# Patient Record
Sex: MALE | Race: WHITE | NOT HISPANIC OR LATINO | Employment: OTHER | URBAN - METROPOLITAN AREA
[De-identification: names, ages, dates, MRNs, and addresses within clinical notes are randomized per-mention and may not be internally consistent; named-entity substitution may affect disease eponyms.]

---

## 2023-06-19 LAB
ALANINE AMINOTRANSFERASE (SGPT) (U/L) IN SER/PLAS: 24 U/L (ref 10–52)
ALBUMIN (G/DL) IN SER/PLAS: 3.9 G/DL (ref 3.4–5)
ALKALINE PHOSPHATASE (U/L) IN SER/PLAS: 107 U/L (ref 33–136)
ANION GAP IN SER/PLAS: 13 MMOL/L (ref 10–20)
ASPARTATE AMINOTRANSFERASE (SGOT) (U/L) IN SER/PLAS: 22 U/L (ref 9–39)
BILIRUBIN TOTAL (MG/DL) IN SER/PLAS: 0.8 MG/DL (ref 0–1.2)
CALCIUM (MG/DL) IN SER/PLAS: 9 MG/DL (ref 8.6–10.3)
CARBON DIOXIDE, TOTAL (MMOL/L) IN SER/PLAS: 25 MMOL/L (ref 21–32)
CHLORIDE (MMOL/L) IN SER/PLAS: 102 MMOL/L (ref 98–107)
CHOLESTEROL (MG/DL) IN SER/PLAS: 78 MG/DL (ref 0–199)
CHOLESTEROL IN HDL (MG/DL) IN SER/PLAS: 32.9 MG/DL
CHOLESTEROL/HDL RATIO: 2.4
CREATININE (MG/DL) IN SER/PLAS: 1.45 MG/DL (ref 0.5–1.3)
ERYTHROCYTE DISTRIBUTION WIDTH (RATIO) BY AUTOMATED COUNT: 15.6 % (ref 11.5–14.5)
ERYTHROCYTE MEAN CORPUSCULAR HEMOGLOBIN CONCENTRATION (G/DL) BY AUTOMATED: 32.3 G/DL (ref 32–36)
ERYTHROCYTE MEAN CORPUSCULAR VOLUME (FL) BY AUTOMATED COUNT: 88 FL (ref 80–100)
ERYTHROCYTES (10*6/UL) IN BLOOD BY AUTOMATED COUNT: 4.9 X10E12/L (ref 4.5–5.9)
GFR MALE: 52 ML/MIN/1.73M2
GLUCOSE (MG/DL) IN SER/PLAS: 129 MG/DL (ref 74–99)
HEMATOCRIT (%) IN BLOOD BY AUTOMATED COUNT: 43.3 % (ref 41–52)
HEMOGLOBIN (G/DL) IN BLOOD: 14 G/DL (ref 13.5–17.5)
INR IN PPP BY COAGULATION ASSAY: 1.6 (ref 0.9–1.1)
LDL: 29 MG/DL (ref 0–99)
LEUKOCYTES (10*3/UL) IN BLOOD BY AUTOMATED COUNT: 9.6 X10E9/L (ref 4.4–11.3)
MAGNESIUM (MG/DL) IN SER/PLAS: 2.31 MG/DL (ref 1.6–2.4)
NATRIURETIC PEPTIDE B (PG/ML) IN SER/PLAS: 159 PG/ML (ref 0–99)
PLATELETS (10*3/UL) IN BLOOD AUTOMATED COUNT: 140 X10E9/L (ref 150–450)
POTASSIUM (MMOL/L) IN SER/PLAS: 4.2 MMOL/L (ref 3.5–5.3)
PROTEIN TOTAL: 7.6 G/DL (ref 6.4–8.2)
PROTHROMBIN TIME (PT) IN PPP BY COAGULATION ASSAY: 18.6 SEC (ref 9.8–13.4)
SODIUM (MMOL/L) IN SER/PLAS: 136 MMOL/L (ref 136–145)
TRIGLYCERIDE (MG/DL) IN SER/PLAS: 81 MG/DL (ref 0–149)
UREA NITROGEN (MG/DL) IN SER/PLAS: 37 MG/DL (ref 6–23)
VLDL: 16 MG/DL (ref 0–40)

## 2023-07-18 LAB
MUCUS, URINE: NORMAL /LPF
RBC, URINE: NORMAL /HPF (ref 0–5)
WBC, URINE: <1 /HPF (ref 0–5)

## 2023-07-24 LAB
ALANINE AMINOTRANSFERASE (SGPT) (U/L) IN SER/PLAS: 20 U/L (ref 10–52)
ALBUMIN (G/DL) IN SER/PLAS: 3.9 G/DL (ref 3.4–5)
ALKALINE PHOSPHATASE (U/L) IN SER/PLAS: 105 U/L (ref 33–136)
ANION GAP IN SER/PLAS: 11 MMOL/L (ref 10–20)
ASPARTATE AMINOTRANSFERASE (SGOT) (U/L) IN SER/PLAS: 18 U/L (ref 9–39)
BASOPHILS (10*3/UL) IN BLOOD BY AUTOMATED COUNT: 0.07 X10E9/L (ref 0–0.1)
BASOPHILS/100 LEUKOCYTES IN BLOOD BY AUTOMATED COUNT: 0.7 % (ref 0–2)
BILIRUBIN TOTAL (MG/DL) IN SER/PLAS: 0.9 MG/DL (ref 0–1.2)
CALCIUM (MG/DL) IN SER/PLAS: 8.9 MG/DL (ref 8.6–10.3)
CARBON DIOXIDE, TOTAL (MMOL/L) IN SER/PLAS: 26 MMOL/L (ref 21–32)
CHLORIDE (MMOL/L) IN SER/PLAS: 102 MMOL/L (ref 98–107)
CHOLESTEROL (MG/DL) IN SER/PLAS: 80 MG/DL (ref 0–199)
CHOLESTEROL IN HDL (MG/DL) IN SER/PLAS: 33.3 MG/DL
CHOLESTEROL/HDL RATIO: 2.4
CREATININE (MG/DL) IN SER/PLAS: 1.55 MG/DL (ref 0.5–1.3)
EOSINOPHILS (10*3/UL) IN BLOOD BY AUTOMATED COUNT: 0.27 X10E9/L (ref 0–0.4)
EOSINOPHILS/100 LEUKOCYTES IN BLOOD BY AUTOMATED COUNT: 2.6 % (ref 0–6)
ERYTHROCYTE DISTRIBUTION WIDTH (RATIO) BY AUTOMATED COUNT: 15.7 % (ref 11.5–14.5)
ERYTHROCYTE MEAN CORPUSCULAR HEMOGLOBIN CONCENTRATION (G/DL) BY AUTOMATED: 32.3 G/DL (ref 32–36)
ERYTHROCYTE MEAN CORPUSCULAR VOLUME (FL) BY AUTOMATED COUNT: 88 FL (ref 80–100)
ERYTHROCYTES (10*6/UL) IN BLOOD BY AUTOMATED COUNT: 4.72 X10E12/L (ref 4.5–5.9)
GFR MALE: 48 ML/MIN/1.73M2
GLUCOSE (MG/DL) IN SER/PLAS: 121 MG/DL (ref 74–99)
HEMATOCRIT (%) IN BLOOD BY AUTOMATED COUNT: 41.5 % (ref 41–52)
HEMOGLOBIN (G/DL) IN BLOOD: 13.4 G/DL (ref 13.5–17.5)
IMMATURE GRANULOCYTES/100 LEUKOCYTES IN BLOOD BY AUTOMATED COUNT: 0.4 % (ref 0–0.9)
LDL: 32 MG/DL (ref 0–99)
LEUKOCYTES (10*3/UL) IN BLOOD BY AUTOMATED COUNT: 10.4 X10E9/L (ref 4.4–11.3)
LYMPHOCYTES (10*3/UL) IN BLOOD BY AUTOMATED COUNT: 1.46 X10E9/L (ref 0.8–3)
LYMPHOCYTES/100 LEUKOCYTES IN BLOOD BY AUTOMATED COUNT: 14 % (ref 13–44)
MONOCYTES (10*3/UL) IN BLOOD BY AUTOMATED COUNT: 0.8 X10E9/L (ref 0.05–0.8)
MONOCYTES/100 LEUKOCYTES IN BLOOD BY AUTOMATED COUNT: 7.7 % (ref 2–10)
NEUTROPHILS (10*3/UL) IN BLOOD BY AUTOMATED COUNT: 7.79 X10E9/L (ref 1.6–5.5)
NEUTROPHILS/100 LEUKOCYTES IN BLOOD BY AUTOMATED COUNT: 74.6 % (ref 40–80)
PLATELETS (10*3/UL) IN BLOOD AUTOMATED COUNT: 133 X10E9/L (ref 150–450)
POTASSIUM (MMOL/L) IN SER/PLAS: 4.2 MMOL/L (ref 3.5–5.3)
PROSTATE SPECIFIC AG (NG/ML) IN SER/PLAS: 2.7 NG/ML (ref 0–4)
PROTEIN TOTAL: 7.6 G/DL (ref 6.4–8.2)
SODIUM (MMOL/L) IN SER/PLAS: 135 MMOL/L (ref 136–145)
TRIGLYCERIDE (MG/DL) IN SER/PLAS: 76 MG/DL (ref 0–149)
URATE (MG/DL) IN SER/PLAS: 7.5 MG/DL (ref 4–7.5)
UREA NITROGEN (MG/DL) IN SER/PLAS: 35 MG/DL (ref 6–23)
VLDL: 15 MG/DL (ref 0–40)

## 2023-07-25 ENCOUNTER — HOSPITAL ENCOUNTER (OUTPATIENT)
Dept: DATA CONVERSION | Facility: HOSPITAL | Age: 73
End: 2023-07-25
Attending: INTERNAL MEDICINE

## 2023-07-25 DIAGNOSIS — I50.30 UNSPECIFIED DIASTOLIC (CONGESTIVE) HEART FAILURE (MULTI): ICD-10-CM

## 2023-07-25 DIAGNOSIS — I77.810 THORACIC AORTIC ECTASIA (CMS-HCC): ICD-10-CM

## 2023-07-25 LAB
ESTIMATED AVERAGE GLUCOSE FOR HBA1C: 134 MG/DL
HEMOGLOBIN A1C/HEMOGLOBIN TOTAL IN BLOOD: 6.3 %

## 2023-07-26 RX ORDER — METFORMIN HYDROCHLORIDE 750 MG/1
1 TABLET, EXTENDED RELEASE ORAL DAILY
COMMUNITY
Start: 2017-02-20 | End: 2023-07-27 | Stop reason: SDUPTHER

## 2023-07-26 RX ORDER — METOPROLOL SUCCINATE 100 MG/1
1 TABLET, EXTENDED RELEASE ORAL DAILY
COMMUNITY
Start: 2022-08-22 | End: 2024-04-05 | Stop reason: SDUPTHER

## 2023-07-26 RX ORDER — BLOOD-GLUCOSE METER
EACH MISCELLANEOUS
COMMUNITY
Start: 2020-05-08 | End: 2023-11-20 | Stop reason: SDUPTHER

## 2023-07-26 RX ORDER — GABAPENTIN 300 MG/1
1 CAPSULE ORAL NIGHTLY
COMMUNITY
Start: 2022-06-15 | End: 2023-07-27 | Stop reason: SDUPTHER

## 2023-07-26 RX ORDER — CHOLECALCIFEROL (VITAMIN D3) 25 MCG
1 TABLET ORAL DAILY
COMMUNITY

## 2023-07-26 RX ORDER — METAPROTERENOL SULFATE 10 MG
TABLET ORAL DAILY
COMMUNITY
Start: 2022-05-16

## 2023-07-26 RX ORDER — NAPROXEN SODIUM 220 MG/1
1 TABLET, FILM COATED ORAL DAILY
COMMUNITY
Start: 2022-05-09

## 2023-07-26 RX ORDER — SPIRONOLACTONE 25 MG/1
1 TABLET ORAL DAILY
COMMUNITY
Start: 2022-05-16 | End: 2024-04-05 | Stop reason: SDUPTHER

## 2023-07-26 RX ORDER — MUPIROCIN 20 MG/G
OINTMENT TOPICAL
COMMUNITY
Start: 2022-05-11

## 2023-07-26 RX ORDER — TRIAMCINOLONE ACETONIDE 1 MG/G
CREAM TOPICAL
COMMUNITY
Start: 2022-05-11

## 2023-07-26 RX ORDER — POTASSIUM CHLORIDE 20 MEQ/1
1 TABLET, EXTENDED RELEASE ORAL DAILY
COMMUNITY
Start: 2022-05-09 | End: 2024-05-29 | Stop reason: SDUPTHER

## 2023-07-26 RX ORDER — ATORVASTATIN CALCIUM 40 MG/1
40 TABLET, FILM COATED ORAL NIGHTLY
COMMUNITY
End: 2024-04-05 | Stop reason: SDUPTHER

## 2023-07-26 RX ORDER — MULTIVITAMIN
1 TABLET ORAL DAILY
COMMUNITY
Start: 2022-05-16

## 2023-07-26 RX ORDER — EMPAGLIFLOZIN 25 MG/1
25 TABLET, FILM COATED ORAL DAILY
COMMUNITY
End: 2024-04-05 | Stop reason: SDUPTHER

## 2023-07-26 RX ORDER — FUROSEMIDE 40 MG/1
1 TABLET ORAL 2 TIMES DAILY
COMMUNITY
Start: 2017-11-08 | End: 2024-04-05 | Stop reason: SDUPTHER

## 2023-07-26 RX ORDER — RIVAROXABAN 20 MG/1
20 TABLET, FILM COATED ORAL
COMMUNITY
End: 2023-10-12 | Stop reason: SDUPTHER

## 2023-07-26 RX ORDER — DOXAZOSIN 8 MG/1
8 TABLET ORAL DAILY
COMMUNITY

## 2023-07-26 RX ORDER — LOSARTAN POTASSIUM 25 MG/1
1 TABLET ORAL DAILY
COMMUNITY
Start: 2022-07-07 | End: 2024-04-05 | Stop reason: SDUPTHER

## 2023-07-27 ENCOUNTER — OFFICE VISIT (OUTPATIENT)
Dept: PRIMARY CARE | Facility: CLINIC | Age: 73
End: 2023-07-27
Payer: MEDICARE

## 2023-07-27 VITALS
DIASTOLIC BLOOD PRESSURE: 60 MMHG | TEMPERATURE: 97.6 F | WEIGHT: 315 LBS | HEIGHT: 71 IN | BODY MASS INDEX: 44.1 KG/M2 | OXYGEN SATURATION: 95 % | SYSTOLIC BLOOD PRESSURE: 118 MMHG | HEART RATE: 62 BPM

## 2023-07-27 DIAGNOSIS — I10 BENIGN ESSENTIAL HYPERTENSION: ICD-10-CM

## 2023-07-27 DIAGNOSIS — E66.01 CLASS 3 SEVERE OBESITY DUE TO EXCESS CALORIES WITH SERIOUS COMORBIDITY AND BODY MASS INDEX (BMI) OF 50.0 TO 59.9 IN ADULT (MULTI): ICD-10-CM

## 2023-07-27 DIAGNOSIS — E11.42 DIABETIC POLYNEUROPATHY ASSOCIATED WITH TYPE 2 DIABETES MELLITUS (MULTI): ICD-10-CM

## 2023-07-27 DIAGNOSIS — E79.0 ASYMPTOMATIC HYPERURICEMIA: ICD-10-CM

## 2023-07-27 DIAGNOSIS — I35.0 NONRHEUMATIC AORTIC VALVE STENOSIS: ICD-10-CM

## 2023-07-27 DIAGNOSIS — E11.42 TYPE 2 DIABETES MELLITUS WITH DIABETIC POLYNEUROPATHY, WITHOUT LONG-TERM CURRENT USE OF INSULIN (MULTI): Primary | ICD-10-CM

## 2023-07-27 DIAGNOSIS — N13.8 BPH WITH OBSTRUCTION/LOWER URINARY TRACT SYMPTOMS: ICD-10-CM

## 2023-07-27 DIAGNOSIS — N40.1 BPH WITH OBSTRUCTION/LOWER URINARY TRACT SYMPTOMS: ICD-10-CM

## 2023-07-27 DIAGNOSIS — G47.33 OSA ON CPAP: ICD-10-CM

## 2023-07-27 DIAGNOSIS — I48.0 PAROXYSMAL ATRIAL FIBRILLATION (MULTI): ICD-10-CM

## 2023-07-27 PROBLEM — I77.810 ASCENDING AORTA DILATATION (CMS-HCC): Status: ACTIVE | Noted: 2023-07-27

## 2023-07-27 PROBLEM — I25.10 CAD (CORONARY ARTERY DISEASE): Status: ACTIVE | Noted: 2023-07-27

## 2023-07-27 PROBLEM — M25.512 SHOULDER PAIN, BILATERAL: Status: ACTIVE | Noted: 2023-07-27

## 2023-07-27 PROBLEM — I50.30 (HFPEF) HEART FAILURE WITH PRESERVED EJECTION FRACTION (MULTI): Status: ACTIVE | Noted: 2023-07-27

## 2023-07-27 PROBLEM — E11.9 DIABETES MELLITUS, TYPE 2 (MULTI): Status: ACTIVE | Noted: 2023-07-27

## 2023-07-27 PROBLEM — E78.5 HYPERLIPIDEMIA: Status: ACTIVE | Noted: 2023-07-27

## 2023-07-27 PROBLEM — R09.89 BRUIT OF LEFT CAROTID ARTERY: Status: ACTIVE | Noted: 2023-07-27

## 2023-07-27 PROBLEM — M25.511 SHOULDER PAIN, BILATERAL: Status: ACTIVE | Noted: 2023-07-27

## 2023-07-27 PROBLEM — E11.40 DIABETIC NEUROPATHY (MULTI): Status: ACTIVE | Noted: 2023-07-27

## 2023-07-27 PROCEDURE — 3044F HG A1C LEVEL LT 7.0%: CPT | Performed by: INTERNAL MEDICINE

## 2023-07-27 PROCEDURE — 3008F BODY MASS INDEX DOCD: CPT | Performed by: INTERNAL MEDICINE

## 2023-07-27 PROCEDURE — 3078F DIAST BP <80 MM HG: CPT | Performed by: INTERNAL MEDICINE

## 2023-07-27 PROCEDURE — 1036F TOBACCO NON-USER: CPT | Performed by: INTERNAL MEDICINE

## 2023-07-27 PROCEDURE — 1159F MED LIST DOCD IN RCRD: CPT | Performed by: INTERNAL MEDICINE

## 2023-07-27 PROCEDURE — 4010F ACE/ARB THERAPY RXD/TAKEN: CPT | Performed by: INTERNAL MEDICINE

## 2023-07-27 PROCEDURE — 99214 OFFICE O/P EST MOD 30 MIN: CPT | Performed by: INTERNAL MEDICINE

## 2023-07-27 PROCEDURE — 3074F SYST BP LT 130 MM HG: CPT | Performed by: INTERNAL MEDICINE

## 2023-07-27 RX ORDER — METFORMIN HYDROCHLORIDE 750 MG/1
750 TABLET, EXTENDED RELEASE ORAL DAILY
Qty: 90 TABLET | Refills: 1 | Status: SHIPPED | OUTPATIENT
Start: 2023-07-27 | End: 2024-01-25 | Stop reason: SDUPTHER

## 2023-07-27 RX ORDER — ALLOPURINOL 100 MG/1
100 TABLET ORAL DAILY
Qty: 90 TABLET | Refills: 1 | Status: SHIPPED | OUTPATIENT
Start: 2023-07-27 | End: 2024-01-25 | Stop reason: SDUPTHER

## 2023-07-27 RX ORDER — GABAPENTIN 300 MG/1
300 CAPSULE ORAL NIGHTLY
Qty: 90 CAPSULE | Refills: 1 | Status: SHIPPED | OUTPATIENT
Start: 2023-07-27 | End: 2024-01-25 | Stop reason: SDUPTHER

## 2023-07-27 RX ORDER — ALLOPURINOL 100 MG/1
100 TABLET ORAL DAILY
COMMUNITY
End: 2023-07-27 | Stop reason: SDUPTHER

## 2023-07-27 ASSESSMENT — ENCOUNTER SYMPTOMS
LOSS OF SENSATION IN FEET: 1
OCCASIONAL FEELINGS OF UNSTEADINESS: 0
DEPRESSION: 0

## 2023-07-27 NOTE — PROGRESS NOTES
"Subjective   Patient ID: Fermin Coronado is a 73 y.o. male who presents for 6 month follow up .  HPI    Review of Systems    Objective     Blood pressure 118/60, pulse 62, temperature 36.4 °C (97.6 °F), temperature source Temporal, height 1.791 m (5' 10.5\"), weight (!) 179 kg (395 lb 9.6 oz), SpO2 95 %.   Physical Exam    Assessment/Plan   Problem List Items Addressed This Visit       Asymptomatic hyperuricemia    Relevant Medications    allopurinol (Zyloprim) 100 mg tablet    Other Relevant Orders    Comprehensive Metabolic Panel    Uric acid    Benign essential hypertension    BPH with obstruction/lower urinary tract symptoms    Diabetes mellitus, type 2 (CMS/HCC) - Primary    Relevant Medications    metFORMIN XR (Glucophage-XR) 750 mg 24 hr tablet    Other Relevant Orders    Hemoglobin A1C    Comprehensive Metabolic Panel    Lipid Panel    Diabetic neuropathy (CMS/HCC)    Relevant Medications    gabapentin (Neurontin) 300 mg capsule    Nonrheumatic aortic valve stenosis    Relevant Medications    metoprolol succinate XL (Toprol-XL) 100 mg 24 hr tablet    TAMARA on CPAP    Paroxysmal atrial fibrillation (CMS/HCC)    Relevant Medications    metoprolol succinate XL (Toprol-XL) 100 mg 24 hr tablet     Other Visit Diagnoses       Class 3 severe obesity due to excess calories with serious comorbidity and body mass index (BMI) of 50.0 to 59.9 in adult (CMS/[facility])              Sugars well controlled overall with A1c at goal.  Will continue present medical therapy and follow up.  Uric acid level in normal range on therapy and no Sx of gout of nephrolithiasis.  Neuropathy Sx well compensated on current therapy.  Will continue present therapy and follow.  He follows with Cardiology re:  a-fib and HTN.  He states he is to have intervention on his aortic valve in the near future.  He had CT of the heart done and states he was recently scheduled for QUAN and another heart cath, though he had a clean cath 1 year ago, but got no " pre-test instructions on this so missed those tests because he had eaten.  Pt. continues to use BiPAP nightly and is tolerating well.  Advised to continue treatment and will continue to follow clinically.  Pt. advised on improving dietary choices and portion control as well as increasing exercise to promote weight loss.    Follow up in 6 months  Lab to be drawn 1 week prior to next office visit.

## 2023-08-08 ENCOUNTER — HOSPITAL ENCOUNTER (OUTPATIENT)
Dept: DATA CONVERSION | Facility: HOSPITAL | Age: 73
End: 2023-08-08
Attending: INTERNAL MEDICINE | Admitting: INTERNAL MEDICINE
Payer: MEDICARE

## 2023-08-08 DIAGNOSIS — I11.0 HYPERTENSIVE HEART DISEASE WITH HEART FAILURE (MULTI): ICD-10-CM

## 2023-08-08 DIAGNOSIS — I25.119 ATHEROSCLEROTIC HEART DISEASE OF NATIVE CORONARY ARTERY WITH UNSPECIFIED ANGINA PECTORIS (CMS-HCC): ICD-10-CM

## 2023-08-08 DIAGNOSIS — I50.9 HEART FAILURE, UNSPECIFIED (MULTI): ICD-10-CM

## 2023-08-08 DIAGNOSIS — E66.9 OBESITY, UNSPECIFIED: ICD-10-CM

## 2023-08-08 DIAGNOSIS — I35.0 NONRHEUMATIC AORTIC (VALVE) STENOSIS: ICD-10-CM

## 2023-08-08 DIAGNOSIS — I50.30 UNSPECIFIED DIASTOLIC (CONGESTIVE) HEART FAILURE (MULTI): ICD-10-CM

## 2023-08-08 DIAGNOSIS — E78.5 HYPERLIPIDEMIA, UNSPECIFIED: ICD-10-CM

## 2023-08-08 DIAGNOSIS — E88.810 METABOLIC SYNDROME: ICD-10-CM

## 2023-08-08 DIAGNOSIS — I20.9 ANGINA PECTORIS, UNSPECIFIED (CMS-HCC): ICD-10-CM

## 2023-08-08 LAB
PATIENT TEMPERATURE: 37 DEGREES
PATIENT TEMPERATURE: 37 DEGREES C
PATIENT TEMPERATURE: 37 DEGREES C
POCT BASE EXCESS, ARTERIAL: 2.5 MMOL/L (ref -2–3)
POCT BASE EXCESS, MIXED: 3.2 MMOL/L
POCT BASE EXCESS, VENOUS: 3.6 MMOL/L (ref -2–3)
POCT GLUCOSE: 114 MG/DL (ref 74–99)
POCT GLUCOSE: 144 MG/DL (ref 74–99)
POCT HCO3, ARTERIAL: 28.4 MMOL/L (ref 22–26)
POCT HCO3, MIXED: 29.9 MMOL/L
POCT HCO3, VENOUS: 30.1 MMOL/L (ref 22–26)
POCT OXY HEMOGLOBIN, ARTERIAL: 89.5 % (ref 94–98)
POCT OXY HEMOGLOBIN, MIXED: 60.8 % (ref 45–75)
POCT OXY HEMOGLOBIN, VENOUS: 62.7 % (ref 45–75)
POCT PCO2, ARTERIAL: 48 MMHG (ref 38–42)
POCT PCO2, MIXED: 53 MMHG
POCT PCO2, VENOUS: 52 MMHG (ref 41–51)
POCT PH, ARTERIAL: 7.38 (ref 7.38–7.42)
POCT PH, MIXED: 7.36
POCT PH, VENOUS: 7.37 (ref 7.33–7.43)
POCT PO2, ARTERIAL: 65 MMHG (ref 85–95)
POCT PO2, MIXED: 41 MMHG
POCT PO2, VENOUS: 41 MMHG (ref 35–45)
POCT SO2, ARTERIAL: 93 % (ref 94–100)
POCT SO2, MIXED: 62 %
POCT SO2, VENOUS: 64 % (ref 45–75)

## 2023-09-08 LAB
ANION GAP IN SER/PLAS: 13 MMOL/L (ref 10–20)
CALCIUM (MG/DL) IN SER/PLAS: 9.3 MG/DL (ref 8.6–10.3)
CARBON DIOXIDE, TOTAL (MMOL/L) IN SER/PLAS: 28 MMOL/L (ref 21–32)
CHLORIDE (MMOL/L) IN SER/PLAS: 99 MMOL/L (ref 98–107)
CREATININE (MG/DL) IN SER/PLAS: 1.6 MG/DL (ref 0.5–1.3)
ERYTHROCYTE DISTRIBUTION WIDTH (RATIO) BY AUTOMATED COUNT: 15.6 % (ref 11.5–14.5)
ERYTHROCYTE MEAN CORPUSCULAR HEMOGLOBIN CONCENTRATION (G/DL) BY AUTOMATED: 31.8 G/DL (ref 32–36)
ERYTHROCYTE MEAN CORPUSCULAR VOLUME (FL) BY AUTOMATED COUNT: 88 FL (ref 80–100)
ERYTHROCYTES (10*6/UL) IN BLOOD BY AUTOMATED COUNT: 5.12 X10E12/L (ref 4.5–5.9)
GFR MALE: 46 ML/MIN/1.73M2
GLUCOSE (MG/DL) IN SER/PLAS: 121 MG/DL (ref 74–99)
HEMATOCRIT (%) IN BLOOD BY AUTOMATED COUNT: 44.9 % (ref 41–52)
HEMOGLOBIN (G/DL) IN BLOOD: 14.3 G/DL (ref 13.5–17.5)
INR IN PPP BY COAGULATION ASSAY: 1.5 (ref 0.9–1.1)
LEUKOCYTES (10*3/UL) IN BLOOD BY AUTOMATED COUNT: 10.8 X10E9/L (ref 4.4–11.3)
PLATELETS (10*3/UL) IN BLOOD AUTOMATED COUNT: 137 X10E9/L (ref 150–450)
POTASSIUM (MMOL/L) IN SER/PLAS: 4.4 MMOL/L (ref 3.5–5.3)
PROTHROMBIN TIME (PT) IN PPP BY COAGULATION ASSAY: 16.4 SEC (ref 9.8–12.8)
SODIUM (MMOL/L) IN SER/PLAS: 136 MMOL/L (ref 136–145)
UREA NITROGEN (MG/DL) IN SER/PLAS: 37 MG/DL (ref 6–23)

## 2023-09-14 ENCOUNTER — PATIENT OUTREACH (OUTPATIENT)
Dept: PRIMARY CARE | Facility: CLINIC | Age: 73
End: 2023-09-14
Payer: MEDICARE

## 2023-09-14 RX ORDER — ACETAMINOPHEN 325 MG/1
650 TABLET ORAL EVERY 6 HOURS PRN
COMMUNITY

## 2023-09-14 NOTE — PROGRESS NOTES
Pt recovering well at home S/P TAVR. BP stable. No oozing from incision or groin sites. Pain is manageable without meds. Reviewed discharge instructions and cardiology follow up schedule with pt. Pt verbalized understanding of both. Pt declined to schedule with PCP at this time. Confirmed next scheduled follow up with Dr. Purcell on 1/25/2024 at 13:20. Pt disenrolled from TCM services at this time.    [facility]  Discharge Diagnosis: S/P TAVR (transcatheter aortic valve replacement)  Procedure Date: 9/12/2023 TAVR  Admission Date: 9/12/2023  Discharge Date: 9/13/2023    PCP Appointment Date: 1/25/2024 13:20  Labs due 4-7 days,  Specialist Appointment Date: 9/21/2023 14:00 Dr. Any Lim CNP, Cardiology,  Echocardiogram and Cardiology in one month  Hospital Encounter and Summary: Linked   See discharge assessment below for further details

## 2023-09-18 LAB
ANION GAP IN SER/PLAS: 12 MMOL/L (ref 10–20)
CALCIUM (MG/DL) IN SER/PLAS: 9.1 MG/DL (ref 8.6–10.3)
CARBON DIOXIDE, TOTAL (MMOL/L) IN SER/PLAS: 26 MMOL/L (ref 21–32)
CHLORIDE (MMOL/L) IN SER/PLAS: 101 MMOL/L (ref 98–107)
CREATININE (MG/DL) IN SER/PLAS: 1.59 MG/DL (ref 0.5–1.3)
ERYTHROCYTE DISTRIBUTION WIDTH (RATIO) BY AUTOMATED COUNT: 16 % (ref 11.5–14.5)
ERYTHROCYTE MEAN CORPUSCULAR HEMOGLOBIN CONCENTRATION (G/DL) BY AUTOMATED: 32.1 G/DL (ref 32–36)
ERYTHROCYTE MEAN CORPUSCULAR VOLUME (FL) BY AUTOMATED COUNT: 89 FL (ref 80–100)
ERYTHROCYTES (10*6/UL) IN BLOOD BY AUTOMATED COUNT: 4.5 X10E12/L (ref 4.5–5.9)
GFR MALE: 46 ML/MIN/1.73M2
GLUCOSE (MG/DL) IN SER/PLAS: 174 MG/DL (ref 74–99)
HEMATOCRIT (%) IN BLOOD BY AUTOMATED COUNT: 39.9 % (ref 41–52)
HEMOGLOBIN (G/DL) IN BLOOD: 12.8 G/DL (ref 13.5–17.5)
INR IN PPP BY COAGULATION ASSAY: 1.4 (ref 0.9–1.1)
LEUKOCYTES (10*3/UL) IN BLOOD BY AUTOMATED COUNT: 10.7 X10E9/L (ref 4.4–11.3)
PLATELETS (10*3/UL) IN BLOOD AUTOMATED COUNT: 149 X10E9/L (ref 150–450)
POTASSIUM (MMOL/L) IN SER/PLAS: 4.2 MMOL/L (ref 3.5–5.3)
PROTHROMBIN TIME (PT) IN PPP BY COAGULATION ASSAY: 15.3 SEC (ref 9.8–12.8)
SODIUM (MMOL/L) IN SER/PLAS: 135 MMOL/L (ref 136–145)
UREA NITROGEN (MG/DL) IN SER/PLAS: 31 MG/DL (ref 6–23)

## 2023-09-27 DIAGNOSIS — I50.30 HEART FAILURE WITH PRESERVED EJECTION FRACTION, UNSPECIFIED HF CHRONICITY (MULTI): ICD-10-CM

## 2023-09-27 DIAGNOSIS — E11.42 TYPE 2 DIABETES MELLITUS WITH DIABETIC POLYNEUROPATHY, WITHOUT LONG-TERM CURRENT USE OF INSULIN (MULTI): Primary | ICD-10-CM

## 2023-09-29 VITALS — HEIGHT: 73 IN | WEIGHT: 315 LBS | BODY MASS INDEX: 41.75 KG/M2

## 2023-09-30 NOTE — H&P
History of Present Illness:   HPI:    CORNELIO URBINA is a 73 year old Male who is presenting as an outpatient for QUAN and left heart catheterization as evaluation for his severe aortic valve stenosis.   Patient denies any new cardiac issues since last office visit.  During my exam he was resting comfortably in bed.    Comorbidities:   Comorbidites:  ·  Comorbid Conditions atrial fibrillation     Past Medical/Surgical History:        Medical History:   Gross hematuria:        Surg History:   Nonrheumatic aortic valve stenosis: Onset Date: 01-Jun-2023   CAD (coronary artery disease): Onset Date: 01-Jun-2023           Allergies:  ·  No Known Allergies :     Medications Prior to Admission:   Admission Medication Reconciliation has not been completed for this patient.    Review of Systems:   Constitutional: NEGATIVE: Fever, Chills, Anorexia,  Weight Loss, Malaise     Eyes: NEGATIVE: Blurry Vision, Drainage, Diploplia,  Redness, Vision Loss/ Change     ENMT: NEGATIVE: Nasal Discharge, Nasal Congestion,  Ear Pain, Mouth Pain, Throat Pain     Respiratory: NEGATIVE: Dry Cough, Productive Cough,  Hemoptysis, Wheezing, Shortness of Breath     Cardiac: POSITIVE: Dyspnea on Exertion; NEGATIVE:  Chest Pain, Orthopnea, Palpitations, Syncope     Gastrointestinal: NEGATIVE: Nausea, Vomiting, Diarrhea,  Constipation, Abdominal Pain     Genitourinary: NEGATIVE: Discharge, Dysuria, Flank  Pain, Frequency, Hematuria     Musculoskeletal: NEGATIVE: Decreased ROM, Pain, Swelling,  Stiffness, Weakness     Neurological: NEGATIVE: Dizziness, Confusion, Headache,  Seizures, Syncope     Psychiatric: NEGATIVE: Mood Changes, Anxiety, Hallucinations,  Sleep Changes, Suicidal Ideas     Skin: NEGATIVE: Mass, Pain, Pruritus, Rash, Ulcer     Endocrine: NEGATIVE: Heat Intolerance, Cold Intolerance,  Sweat, Polyuria, Thirst     Hematologic/Lymph: NEGATIVE: Anemia, Bruising, Easy  Bleeding, Night Sweats, Petechiae     Allergic/Immunologic: NEGATIVE:  Anaphylaxis, Itchy/  Teary Eyes, Itching, Sneezing, Swelling       Objective:     Objective Information:           Weights   8/8 7:33: Weight in kg (Weight (kg))  175  8/8 7:33: Weight in lbs ((lbs))  385.8  8/8 7:33: BMI (kg/m2) (BMI (kg/m2))  50.911    Physical Exam Narrative:  ·  Physical Exam:    Constitutional: Cooperative, in no acute distress, alert, appears stated age.  Skin: Skin color, texture, turgor normal. No rashes or lesions.  Head: Normocephalic. No masses, lesions, tenderness or abnormalities  Eyes: Extraocular movements are grossly intact.  Mouth and throat: Mucous membranes moist  Neck: Neck supple, no carotid bruits, no JVD  Respiratory: Lungs clear to auscultation, no wheezing or rhonchi, no use of accessory muscles  Chest wall: No scars, normal excursion with respiration  Cardiovascular: Irregular rhythm, + murmur  Gastrointestinal: Abdomen soft, nontender. Bowel sounds normal.Morbid obesity  Musculoskeletal: Strength equal in upper extremities  Extremities: Trace pitting edema  Neurologic: Sensation grossly intact, alert and oriented ×3      Radiology Results:    Results:        Impression:    1. The thoracoabdominal aorta is patent without atherosclerotic  disease.  2.Severe calcifications of the aortic valve correlating with history  of aortic stenosis.  3. Rim calcified pericapsular hepatic cyst or prior capsular hematoma.  4. Large right-sided parapelvic cyst versus extrarenal pelvis versus  UPJ obstruction.        CT TAVR Full Contrast Chest Abdomen Pelvis [Jun 27 2023  5:42PM]      Conclusion:  CONCLUSIONS:  1. Left ventricular systolic function is normal with a 60-65% estimated ejection fraction.  2. Poorly visualized anatomical structures due to suboptimal image quality.  3. Severe AS and indeterminate amount of AI (Likely mild). Technically very difficult study.  4. There is moderate dilatation of the ascending aorta.    QUANTITATIVE DATA SUMMARY:  LA VOLUME:  Normal Ranges:  LA Vol  A4C:        98.6 ml   (22+/-6mL/m2)  LA Vol Index A4C:  33.1ml/m2  LA Area A4C:       28.7 cm2  LA Major Axis A4C: 7.1 cm  LA Vol A4C:        93.6 ml    LV SYSTOLIC FUNCTION BY 2D PLANIMETRY (MOD):  Normal Ranges:  EF-A4C View: 62.7 % (>=55%)  EF-A2C View: 39.2 %  EF-Biplane:  49.5 %    LV DIASTOLIC FUNCTION:  Normal Ranges:  MV Peak E:    1.25 m/s (0.7-1.2 m/s)  MV e'         0.08 m/s (>8.0)  MV lateral e' 0.09 m/s  MV medial e'  0.08 m/s  E/e' Ratio:   14.74    (<8.0)    AORTIC VALVE:  Normal Ranges:  AoV Vmax:                4.18 m/s  (<=1.7m/s)  AoV Peak P.7 mmHg (<20mmHg)  AoV Mean P.1 mmHg (1.7-11.5mmHg)  LVOT Max Herman:            1.16 m/s  (<=1.1m/s)  AoV VTI:                 116.91 cm (18-25cm)  LVOT VTI:                28.27 cm  LVOT Diameter:           2.50 cm   (1.8-2.4cm)  AoV Area, VTI:           1.18 cm2  (2.5-5.5cm2)  AoV Area,Vmax:  1.37 cm2  (2.5-4.5cm2)  AoV Dimensionless Index: 0.24    AORTIC INSUFFICIENCY:  AI Vmax:       3.98 m/s  AI Half-time:  742 msec  AI Decel Time: 2557 msec  AI Decel Rate: 170.49 cm/s2    RIGHT VENTRICLE:  RV s' 0.12 m/s    TRICUSPID VALVE/RVSP:  Normal Ranges:  IVC Diam: 2.47 cm  TV e'     0.2 m/s    AORTA:  Asc Ao Diam 4.38 cm      [id number] Magdalena Sheffield MD  Electronically signed on 2023 at 6:08:25 PM        *** Final ***     Echocardiogram [2023  6:08PM]      Assessment and Plan:     Impression 1: Severe aortic valve stenosis   Plan for Impression 1: Patient here for planned QUAN  followed by left heart catheterization for further evaluation of his aortic valve stenosis.  Patient will follow-up with the structural heart clinic.  No new problems or events since last visit.       Electronic Signatures:  Artem Bauer ()  (Signed 08-Aug-2023 09:07)   Authored: History of Present Illness, Comorbidities,  Past Medical/Surgical History, Allergies, Medications Prior to Admission, Review of Systems, Objective, Assessment and Plan,  Note Completion      Last Updated: 08-Aug-2023 09:07 by Artem Bauer (DO)

## 2023-10-10 PROBLEM — E66.01 MORBID OBESITY (MULTI): Status: ACTIVE | Noted: 2023-10-10

## 2023-10-10 PROBLEM — Z79.01 ON RIVAROXABAN THERAPY: Status: ACTIVE | Noted: 2023-10-10

## 2023-10-10 PROBLEM — I48.19 PERSISTENT ATRIAL FIBRILLATION (MULTI): Status: ACTIVE | Noted: 2023-10-10

## 2023-10-10 NOTE — PROGRESS NOTES
[facility] Card[facility]    Patient Name: Fermin Coronado  Patient : 1950      Scribe Attestation  By signing my name below, IMaria Dolores Scribe   attest that this documentation has been prepared under the direction and in the presence of Artem Bauer DO.       Reason for visit:  This is a 73-year-old male here for follow-up regarding HFpEF, persistent atrial fibrillation, anticoagulation with rivaroxaban, aortic valve stenosis status post TAVR, dilated ascending aorta, mild nonobstructive coronary artery disease, hypertension, dyslipidemia, diabetes mellitus, and morbid obesity.      HPI:  This is a 73-year-old male here for follow-up regarding HFpEF, persistent atrial fibrillation, anticoagulation with rivaroxaban, aortic valve stenosis status post TAVR, dilated ascending aorta, mild nonobstructive coronary artery disease, hypertension, dyslipidemia, diabetes mellitus, and morbid obesity.  Patient was last evaluated by me in 2023.  At that visit I had referred him for a QUAN, left heart catheterization, referral to the structural heart service, ordered some blood work, and asked that he follow-up in several months.  Patient subsequently underwent TAVR on 2023 with an evolute fracture 34 mm valve.  Patient subsequently followed up with outpatient with the structural heart service as well as the heart failure clinic.  BMP done 2023 showed a serum sodium 135, serum potassium 4.2, serum creatinine of 1.59, CBC showed a hemoglobin of 12.8.  Lipid panel done in 2023 showed an LDL cholesterol 32 and triglycerides of 76 while on atorvastatin 40 mg daily.  Echocardiogram done 2023 showed normal left ventricular systolic function with an ejection fraction of 60%, poorly visualized right ventricle, TAVR with a mean aortic valve gradient of 16.1 mmHg.  Left heart catheterization done in 2023 showed mild diffuse coronary artery disease. ECG done today  showed atrial fibrillation with a heart rate of 62 bpm.  The patient reports having shortness of breath on exertion since his TAVR. He believes that his shortness of breath could be due to obesity, deconditioning and aging. He denies worsening lower extremity edema, chest pain, palpitations or lightheadedness. He states that he takes all of his medications as prescribed. During my exam, he was resting comfortably on the exam table.           Assessment/Plan:   1. HFpEF/shortness of breath  The patient has a history of HFpEF.  Echocardiogram done 9/13/2023 showed normal left ventricular systolic function with an ejection fraction of 60%, poorly visualized right ventricle, TAVR with a mean aortic valve gradient of 16.1 mmHg.    He reports persistence shortness of breath on exertion since his TAVR, although he does not appear significantly volume overloaded on exam today except for trivial lower extremity edema.  Shortness of breath could be multifactorial including, aging, deconditioning, obesity and atrial fibrillation.  He should continue current cardiac medications.  Recent lab works as noted in the HPI.  Lab works as noted below will be done in 6 months prior to his next visit.   Chest x-ray was suggested which he declined a this time.   I discussed with him the importance of following a low-sodium heart healthy diet as well as weight loss.   Follow up in 6 months and sooner if necessary.     2. Persistent atrial fibrillation  The patient has a history of persistent atrial fibrillation, on rivaroxaban for thromboembolism prophylaxis, which should be continued.  He should continue metoprolol succinate for heart rate control.  ECG done today showed atrial fibrillation with a heart rate of 62 bpm.    He denies palpitations or lightheadedness.   No plans for rhythm control approach at this time.  Echocardiogram as noted above.  Recent lab works as noted in the HPI.   Lab works as noted below will be done in 6 months  prior to his next visit.   Follow up in 6 months and sooner if necessary.     3. Anticoagulation with rivaroxaban  The patient is on anticoagulation with rivaroxaban for atrial fibrillation.  Recent lab works as noted in the HPI.  Lab works as noted below will be done in 6 months prior to his next visit.     4. Aortic valve stenosis  The patient has a history of aortic valve stenosis status post TAVR.  He underwent TAVR on 9/12/2023 with an evolute fracture 34 mm valve.   The patient reports persistence of breath on exertion which has been generally unchanged.  Shortness of breath could be multifactorial including, aging, deconditioning, obesity and atrial fibrillation.  Echocardiogram done 9/13/2023 showed normal left ventricular systolic function with an ejection fraction of 60%, poorly visualized right ventricle, TAVR with a mean aortic valve gradient of 16.1 mmHg.    He should continue to follow with the Structural Heart Service.     5. Dilated ascending aorta  The patient was noted to have a moderately dilated ascending aorta measured at 4.4 cm on prior echocardiogram.  Recent echocardiogram as noted above.  Continue risk factor modification.  I will continue to monitor this clinically with echocardiogram every 1-2 years.    6. Coronary artery disease  Left heart catheterization done in August 2023 showed mild diffuse coronary artery disease.  ECG done today showed atrial fibrillation with a heart rate of 62 bpm.    He denies anginal chest discomfort.  Blood pressure appears controlled on exam today.  He should continue current antihypertensive medications and antiplatelet therapy.  Echocardiogram done 9/13/2023 showed normal left ventricular systolic function with an ejection fraction of 60%, poorly visualized right ventricle, TAVR with a mean aortic valve gradient of 16.1 mmHg.    Lipid panel done in July 2023 showed an LDL cholesterol 32 and triglycerides of 76 while on atorvastatin 40 mg daily.   Recent lab  works as noted in the HPI.   Lab works as noted below will be done in 6 months prior to his next visit.   Please see lifestyle recommendations below.  Follow up in 6 months and sooner if necessary.     7. Hypertension  Patient has a history of hypertension which appears controlled on exam today.  He should continue his current antihypertensive medications.     8. Dyslipidemia  Lipid panel done in July 2023 showed an LDL cholesterol 32 and triglycerides of 76 while on atorvastatin 40 mg daily.   Please see lifestyle recommendations below.     9. Diabetes Mellitus  Hemoglobin A1c done in January 2023 was 5.9%.  Medication management as per PCP.    10. Morbid obesity  Please see lifestyle recommendations below.     Orders:   Chest x-ray suggested -patient declined  BMP/CBC/magnesium in 6 months,   Follow-up in 6 months.    Lifestyle Recommendations  I recommend a whole-food plant-based diet, an eating pattern that encourages the consumption of unrefined plant foods (such as fruits, vegetables, tubers, whole grains, legumes, nuts and seeds) and discourages meats, dairy products, eggs and processed foods.     The AHA/ACC recommends that the patient consume a dietary pattern that emphasizes intake of vegetables, fruits, and whole grains; includes low-fat dairy products, poultry, fish, legumes, non-tropical vegetable oils, and nuts; and limits intake of sodium, sweets, sugar-sweetened beverages, and red meats.  Adapt this dietary pattern to appropriate calorie requirements (a 500-750 kcal/day deficit to loose weight), personal and cultural food preferences, and nutrition therapy for other medical conditions (including diabetes).  Achieve this pattern by following plans such as the [place] Mediterranean, DASH dietary pattern, or AHA diet.     Engage in 2 hours and 30 minutes per week of moderate-intensity physical activity, or 1 hour and 15 minutes (75 minutes) per week of vigorous-intensity aerobic physical activity, or an  equivalent combination of moderate and vigorous-intensity aerobic physical activity. Aerobic activity should be performed in episodes of at least 10 minutes preferably spread throughout the week.     Adhering to a heart healthy diet, regular exercise habits, avoidance of tobacco products, and maintenance of a healthy weight are crucial components of their heart disease risk reduction.     Any positive review of systems not specifically addressed in the office visit today should be evaluated and treated by the patients primary care physician or in an emergency department if necessary     Patient was notified that results from ordered tests will be called to the patient if it changes current management; it will otherwise be discussed at a future appointment and available on  [employer].     Thank you for allowing me to participate in the care of this patient.        This document was generated using the assistance of voice recognition software. If there are any errors of spelling, grammar, syntax, or meaning; please feel free to contact me directly for clarification.    Past Medical History:  He has a past medical history of Benign prostatic hyperplasia without lower urinary tract symptoms, Personal history of other endocrine, nutritional and metabolic disease, and Unspecified fracture of shaft of humerus, right arm, initial encounter for closed fracture.    Past Surgical History:  He has a past surgical history that includes Tonsillectomy (09/13/2016) and Cholecystectomy (09/13/2016).      Social History:  He reports that he quit smoking about 44 years ago. His smoking use included cigarettes. He has a 2.00 pack-year smoking history. He quit smokeless tobacco use about 43 years ago.  His smokeless tobacco use included chew. He reports current alcohol use. He reports that he does not use drugs.    Family History:  Family History   Problem Relation Name Age of Onset    Hypertension Mother      Hypertension Father       Hypertension Sister      Diabetes Sister      Breast cancer Neg Hx          Allergies:  Patient has no known allergies.    Outpatient Medications:  Current Outpatient Medications   Medication Instructions    acetaminophen (TYLENOL) 650 mg, oral, Every 6 hours PRN    allopurinol (ZYLOPRIM) 100 mg, oral, Daily    aspirin 81 mg chewable tablet 1 tablet, oral, Daily    atorvastatin (LIPITOR) 40 mg, oral, Nightly    cholecalciferol (Vitamin D-3) 25 MCG (1000 UT) tablet 1 tablet, oral, Daily    CRANBERRY ORAL 1 tablet, oral, Daily    doxazosin (CARDURA) 8 mg, oral, Daily    fish oil concentrate (Omega-3) 120-180 mg capsule 1 capsule, oral, Daily    furosemide (Lasix) 40 mg tablet 1 tablet, oral, 2 times daily    gabapentin (NEURONTIN) 300 mg, oral, Nightly    Jardiance 25 mg, oral, Daily    losartan (Cozaar) 25 mg tablet 1 tablet, oral, Daily    metFORMIN XR (GLUCOPHAGE-XR) 750 mg, oral, Daily    metoprolol succinate XL (Toprol-XL) 100 mg 24 hr tablet 1 tablet, oral, Daily    multivitamin with folic acid (One Daily Multivitamin) 400 mcg tablet 1 tablet, oral, Daily    mupirocin (Bactroban) 2 % ointment APPLY TO LEGS WITH TRIAMCINOLONE DAILY    nettle-pumpkin-saw palm-min 17 (Prostate Therapy) capsule oral, Daily    OneTouch Verio test strips strip USE 1 STRIP DAILY.  what ever brand covered by insurance as they prefer    potassium chloride CR (Klor-Con M20) 20 mEq ER tablet 1 tablet, oral, Daily    spironolactone (Aldactone) 25 mg tablet 1 tablet, oral, Daily    triamcinolone (Kenalog) 0.1 % cream APPLY DAILY TO LEGS.    Xarelto 20 mg, oral, Daily with evening meal        ROS:  A 14 point review of systems was done and is negative other than as stated in HPI    Vitals:      2/23/2023     2:09 PM 5/25/2023     1:31 PM 6/15/2023     3:24 PM 7/18/2023     1:06 PM 7/27/2023     1:04 PM 8/8/2023     9:24 AM 8/8/2023     9:34 AM   Vitals   Systolic 114 102 134 138 118     Diastolic 63 56 70 78 60     Heart Rate 57 57 59 60 62    "  Temp     36.4 °C (97.6 °F)     Resp 20 20        Height (in)   1.829 m (6') 1.829 m (6') 1.791 m (5' 10.5\") 1.852 m (6' 0.91\") 1.851 m (6' 0.87\")   Weight (lb) 382.3 391.8 390.25 390 395.6 385.81 385.81   BMI 51.85 kg/m2 53.14 kg/m2 52.93 kg/m2 52.89 kg/m2 55.96 kg/m2 51.02 kg/m2 51.08 kg/m2   BSA (m2) 2.96 m2 3.01 m2 3 m2 3 m2 2.98 m2 3 m2 3 m2        Physical Exam:     Constitutional: Cooperative, in no acute distress, alert, appears stated age.  Skin: Skin color, texture, turgor normal. No rashes or lesions.  Head: Normocephalic. No masses, lesions, tenderness or abnormalities  Eyes: Extraocular movements are grossly intact.  Mouth and throat: Mucous membranes moist  Neck: Neck supple, no carotid bruits, no JVD  Respiratory: Lungs clear to auscultation, no wheezing or rhonchi, no use of accessory muscles  Chest wall: No scars, normal excursion with respiration  Cardiovascular: Irregular rhythm without murmur  Gastrointestinal: Abdomen soft, nontender. Bowel sounds normal.  Musculoskeletal: Strength equal in upper extremities  Extremities: Trivial pitting edema  Neurologic: Sensation grossly intact, alert and oriented ×3    Intake/Output:   No intake/output data recorded.    Outpatient Medications  Current Outpatient Medications on File Prior to Visit   Medication Sig Dispense Refill    acetaminophen (Tylenol) 325 mg tablet Take 2 tablets (650 mg) by mouth every 6 hours if needed for mild pain (1 - 3).      allopurinol (Zyloprim) 100 mg tablet Take 1 tablet (100 mg) by mouth once daily. 90 tablet 1    aspirin 81 mg chewable tablet Chew 1 tablet (81 mg) once daily.      atorvastatin (Lipitor) 40 mg tablet Take 1 tablet (40 mg) by mouth once daily at bedtime.      cholecalciferol (Vitamin D-3) 25 MCG (1000 UT) tablet Take 1 tablet (25 mcg) by mouth once daily.      CRANBERRY ORAL Take 1 tablet by mouth once daily.      doxazosin (Cardura) 8 mg tablet Take 1 tablet (8 mg) by mouth once daily.      fish oil " concentrate (Omega-3) 120-180 mg capsule Take 1 capsule (1 g) by mouth once daily.      furosemide (Lasix) 40 mg tablet Take 1 tablet (40 mg) by mouth 2 times a day.      gabapentin (Neurontin) 300 mg capsule Take 1 capsule (300 mg) by mouth once daily at bedtime. 90 capsule 1    Jardiance 25 mg Take 1 tablet (25 mg) by mouth once daily.      losartan (Cozaar) 25 mg tablet Take 1 tablet (25 mg) by mouth once daily.      metFORMIN XR (Glucophage-XR) 750 mg 24 hr tablet Take 1 tablet (750 mg) by mouth once daily. 90 tablet 1    metoprolol succinate XL (Toprol-XL) 100 mg 24 hr tablet Take 1 tablet (100 mg) by mouth once daily.      multivitamin with folic acid (One Daily Multivitamin) 400 mcg tablet Take 1 tablet by mouth once daily.      mupirocin (Bactroban) 2 % ointment APPLY TO LEGS WITH TRIAMCINOLONE DAILY      nettle-pumpkin-saw palm-min 17 (Prostate Therapy) capsule Take by mouth once daily.      OneTouch Verio test strips strip USE 1 STRIP DAILY.  what ever brand covered by insurance as they prefer      potassium chloride CR (Klor-Con M20) 20 mEq ER tablet Take 1 tablet (20 mEq) by mouth once daily.      spironolactone (Aldactone) 25 mg tablet Take 1 tablet (25 mg) by mouth once daily.      triamcinolone (Kenalog) 0.1 % cream APPLY DAILY TO LEGS.      Xarelto 20 mg tablet Take 1 tablet (20 mg) by mouth once daily in the evening. Take with meals.       No current facility-administered medications on file prior to visit.       Labs: (past 26 weeks)  Recent Results (from the past 4368 hour(s))   CBC    Collection Time: 06/19/23 10:29 AM   Result Value Ref Range    WBC 9.6 4.4 - 11.3 x10E9/L    RBC 4.90 4.50 - 5.90 x10E12/L    Hemoglobin 14.0 13.5 - 17.5 g/dL    Hematocrit 43.3 41.0 - 52.0 %    MCV 88 80 - 100 fL    MCHC 32.3 32.0 - 36.0 g/dL    Platelets 140 (L) 150 - 450 x10E9/L    RDW 15.6 (H) 11.5 - 14.5 %   Protime-INR    Collection Time: 06/19/23 10:29 AM   Result Value Ref Range    Protime 18.6 (H) 9.8 -  13.4 sec    INR 1.6 (H) 0.9 - 1.1   Comprehensive Metabolic Panel    Collection Time: 06/19/23 10:29 AM   Result Value Ref Range    Glucose 129 (H) 74 - 99 mg/dL    Sodium 136 136 - 145 mmol/L    Potassium 4.2 3.5 - 5.3 mmol/L    Chloride 102 98 - 107 mmol/L    Bicarbonate 25 21 - 32 mmol/L    Anion Gap 13 10 - 20 mmol/L    Urea Nitrogen 37 (H) 6 - 23 mg/dL    Creatinine 1.45 (H) 0.50 - 1.30 mg/dL    GFR MALE 52 (A) >90 mL/min/1.73m2    Calcium 9.0 8.6 - 10.3 mg/dL    Albumin 3.9 3.4 - 5.0 g/dL    Alkaline Phosphatase 107 33 - 136 U/L    Total Protein 7.6 6.4 - 8.2 g/dL    AST 22 9 - 39 U/L    Total Bilirubin 0.8 0.0 - 1.2 mg/dL    ALT (SGPT) 24 10 - 52 U/L   B-Type Natriuretic Peptide    Collection Time: 06/19/23 10:29 AM   Result Value Ref Range     (H) 0 - 99 pg/mL   Magnesium    Collection Time: 06/19/23 10:29 AM   Result Value Ref Range    Magnesium 2.31 1.60 - 2.40 mg/dL   Lipid Panel    Collection Time: 06/19/23 10:29 AM   Result Value Ref Range    Cholesterol 78 0 - 199 mg/dL    HDL 32.9 (A) mg/dL    Cholesterol/HDL Ratio 2.4     LDL 29 0 - 99 mg/dL    VLDL 16 0 - 40 mg/dL    Triglycerides 81 0 - 149 mg/dL   Urinalysis Microscopic Only    Collection Time: 07/18/23  1:48 PM   Result Value Ref Range    WBC, Urine <1 0 - 5 /HPF    RBC, Urine NONE 0 - 5 /HPF    Mucus, Urine 1+ /LPF   Lipid Panel    Collection Time: 07/24/23 12:15 PM   Result Value Ref Range    Cholesterol 80 0 - 199 mg/dL    HDL 33.3 (A) mg/dL    Cholesterol/HDL Ratio 2.4     LDL 32 0 - 99 mg/dL    VLDL 15 0 - 40 mg/dL    Triglycerides 76 0 - 149 mg/dL   Prostate Specific Antigen    Collection Time: 07/24/23 12:15 PM   Result Value Ref Range    PSA 2.70 0.00 - 4.00 ng/mL   Comprehensive Metabolic Panel    Collection Time: 07/24/23 12:15 PM   Result Value Ref Range    Glucose 121 (H) 74 - 99 mg/dL    Sodium 135 (L) 136 - 145 mmol/L    Potassium 4.2 3.5 - 5.3 mmol/L    Chloride 102 98 - 107 mmol/L    Bicarbonate 26 21 - 32 mmol/L    Anion  Gap 11 10 - 20 mmol/L    Urea Nitrogen 35 (H) 6 - 23 mg/dL    Creatinine 1.55 (H) 0.50 - 1.30 mg/dL    GFR MALE 48 (A) >90 mL/min/1.73m2    Calcium 8.9 8.6 - 10.3 mg/dL    Albumin 3.9 3.4 - 5.0 g/dL    Alkaline Phosphatase 105 33 - 136 U/L    Total Protein 7.6 6.4 - 8.2 g/dL    AST 18 9 - 39 U/L    Total Bilirubin 0.9 0.0 - 1.2 mg/dL    ALT (SGPT) 20 10 - 52 U/L   CBC and Auto Differential    Collection Time: 07/24/23 12:15 PM   Result Value Ref Range    WBC 10.4 4.4 - 11.3 x10E9/L    RBC 4.72 4.50 - 5.90 x10E12/L    Hemoglobin 13.4 (L) 13.5 - 17.5 g/dL    Hematocrit 41.5 41.0 - 52.0 %    MCV 88 80 - 100 fL    MCHC 32.3 32.0 - 36.0 g/dL    Platelets 133 (L) 150 - 450 x10E9/L    RDW 15.7 (H) 11.5 - 14.5 %    Neutrophils % 74.6 40.0 - 80.0 %    Immature Granulocytes %, Automated 0.4 0.0 - 0.9 %    Lymphocytes % 14.0 13.0 - 44.0 %    Monocytes % 7.7 2.0 - 10.0 %    Eosinophils % 2.6 0.0 - 6.0 %    Basophils % 0.7 0.0 - 2.0 %    Neutrophils Absolute 7.79 (H) 1.60 - 5.50 x10E9/L    Lymphocytes Absolute 1.46 0.80 - 3.00 x10E9/L    Monocytes Absolute 0.80 0.05 - 0.80 x10E9/L    Eosinophils Absolute 0.27 0.00 - 0.40 x10E9/L    Basophils Absolute 0.07 0.00 - 0.10 x10E9/L   Uric Acid    Collection Time: 07/24/23 12:15 PM   Result Value Ref Range    Uric Acid 7.5 4.0 - 7.5 mg/dL   Hemoglobin A1C    Collection Time: 07/24/23 12:15 PM   Result Value Ref Range    Hemoglobin A1C 6.3 (A) %    Estimated Average Glucose 134 MG/DL   POCT GLUCOSE    Collection Time: 08/08/23  9:15 AM   Result Value Ref Range    POCT Glucose 114 (H) 74 - 99 mg/dL   Blood Gas Venous    Collection Time: 08/08/23  1:00 PM   Result Value Ref Range    pH, Venous 7.37 7.33 - 7.43    pCO2, Venous 52 (H) 41 - 51 mmHg    pO2, Venous 41 35 - 45 mmHg    Patient Temperature 37.0 degrees C    SO2, Venous 64 45 - 75 %    OXY Hemoglobin, Venous 62.7 45.0 - 75.0 %    Base Excess, Venous 3.6 (H) -2.0 - 3.0 mmol/L    HCO3, Venous 30.1 (H) 22.0 - 26.0 mmol/L   Blood Gas  Mixed Venous    Collection Time: 08/08/23  1:07 PM   Result Value Ref Range    pH, Mixed 7.36     pCO2, Mixed 53 mmHg    pO2, Mixed 41 mmHg    Patient Temperature 37.0 degrees    SO2, Mixed 62 %    OXY Hemoglobin, Mixed 60.8 45.0 - 75.0 %    Base Excess, Mixed 3.2 mmol/L    HCO3, Mixed 29.9 mmol/L   Blood Gas Arterial    Collection Time: 08/08/23  1:30 PM   Result Value Ref Range    pH, Arterial 7.38 7.38 - 7.42    pCO2, Arterial 48 (H) 38 - 42 mmHg    pO2, Arterial 65 (L) 85 - 95 mmHg    Patient Temperature 37.0 degrees C    SO2, Arterial 93 (L) 94 - 100 %    OXY Hemoglobin, Arterial 89.5 (L) 94.0 - 98.0 %    Base Excess, Arterial 2.5 -2.0 - 3.0 mmol/L    HCO3, Arterial 28.4 (H) 22.0 - 26.0 mmol/L   POCT GLUCOSE    Collection Time: 08/08/23  3:50 PM   Result Value Ref Range    POCT Glucose 144 (H) 74 - 99 mg/dL   Basic Metabolic Panel    Collection Time: 09/08/23 11:29 AM   Result Value Ref Range    Glucose 121 (H) 74 - 99 mg/dL    Sodium 136 136 - 145 mmol/L    Potassium 4.4 3.5 - 5.3 mmol/L    Chloride 99 98 - 107 mmol/L    Bicarbonate 28 21 - 32 mmol/L    Anion Gap 13 10 - 20 mmol/L    Urea Nitrogen 37 (H) 6 - 23 mg/dL    Creatinine 1.60 (H) 0.50 - 1.30 mg/dL    GFR MALE 46 (A) >90 mL/min/1.73m2    Calcium 9.3 8.6 - 10.3 mg/dL   Protime-INR    Collection Time: 09/08/23 11:29 AM   Result Value Ref Range    Protime 16.4 (H) 9.8 - 12.8 sec    INR 1.5 (H) 0.9 - 1.1   CBC    Collection Time: 09/08/23 11:29 AM   Result Value Ref Range    WBC 10.8 4.4 - 11.3 x10E9/L    RBC 5.12 4.50 - 5.90 x10E12/L    Hemoglobin 14.3 13.5 - 17.5 g/dL    Hematocrit 44.9 41.0 - 52.0 %    MCV 88 80 - 100 fL    MCHC 31.8 (L) 32.0 - 36.0 g/dL    Platelets 137 (L) 150 - 450 x10E9/L    RDW 15.6 (H) 11.5 - 14.5 %   ACTIVATED CLOTTING TIME LOW    Collection Time: 09/12/23 11:14 AM   Result Value Ref Range    Activated Clotting Time POC Low Range 376 (H) 89 - 169 SECONDS   Electrocardiogram 12 Lead    Collection Time: 09/12/23 12:27 PM   Result  Value Ref Range    Ventricular Rate 65 BPM    Atrial Rate 394 BPM    QRS Duration 164 ms    QT Interval 490 ms    QTC Calculation(Bazett) 509 ms    R Axis -14 degrees    T Axis 122 degrees    QRS Count 10 beats    Q Onset 213 ms    T Offset 458 ms    QTC Fredericia 503 ms   CBC and Auto Differential    Collection Time: 09/12/23 12:38 PM   Result Value Ref Range    WBC 9.9 4.4 - 11.3 x10E9/L    nRBC 0.0 0.0 - 0.0 /100 WBC    RBC 4.38 (L) 4.50 - 5.90 x10E12/L    Hemoglobin 12.9 (L) 13.5 - 17.5 g/dL    Hematocrit 38.5 (L) 41.0 - 52.0 %    MCV 88 80 - 100 fL    MCHC 33.5 32.0 - 36.0 g/dL    Platelets 130 (L) 150 - 450 x10E9/L    RDW 15.2 (H) 11.5 - 14.5 %    Neutrophils % 72.4 40.0 - 80.0 %    Immature Granulocytes %, Automated 0.4 0.0 - 0.9 %    Lymphocytes % 16.5 13.0 - 44.0 %    Monocytes % 7.7 2.0 - 10.0 %    Eosinophils % 2.4 0.0 - 6.0 %    Basophils % 0.6 0.0 - 2.0 %    Neutrophils Absolute 7.12 (H) 1.60 - 5.50 x10E9/L    Lymphocytes Absolute 1.63 0.80 - 3.00 x10E9/L    Monocytes Absolute 0.76 0.05 - 0.80 x10E9/L    Eosinophils Absolute 0.24 0.00 - 0.40 x10E9/L    Basophils Absolute 0.06 0.00 - 0.10 x10E9/L   Basic Metabolic Panel    Collection Time: 09/12/23 12:38 PM   Result Value Ref Range    Glucose 107 (H) 74 - 99 mg/dL    Sodium 140 136 - 145 mmol/L    Potassium 4.0 3.5 - 5.3 mmol/L    Chloride 101 98 - 107 mmol/L    Bicarbonate 29 21 - 32 mmol/L    Anion Gap 14 10 - 20 mmol/L    Urea Nitrogen 36 (H) 6 - 23 mg/dL    Creatinine 1.53 (H) 0.50 - 1.30 mg/dL    GFR MALE 48 (A) >90 mL/min/1.73m2    Calcium 8.6 8.6 - 10.6 mg/dL   Coagulation Screen    Collection Time: 09/12/23 12:38 PM   Result Value Ref Range    Protime 14.5 (H) 9.8 - 12.8 sec    INR 1.3 (H) 0.9 - 1.1    aPTT > 200 (HH) 27 - 38 sec   Magnesium    Collection Time: 09/12/23 12:38 PM   Result Value Ref Range    Magnesium 2.29 1.60 - 2.40 mg/dL   POCT GLUCOSE    Collection Time: 09/12/23  4:14 PM   Result Value Ref Range    POCT Glucose 157 (H) 74 -  99 mg/dL   POCT GLUCOSE    Collection Time: 09/12/23  8:38 PM   Result Value Ref Range    POCT Glucose 149 (H) 74 - 99 mg/dL   Electrocardiogram 12 Lead    Collection Time: 09/13/23  4:30 AM   Result Value Ref Range    Ventricular Rate 72 BPM    Atrial Rate 150 BPM    QRS Duration 156 ms    QT Interval 446 ms    QTC Calculation(Bazett) 488 ms    R Axis -15 degrees    T Axis 130 degrees    QRS Count 12 beats    Q Onset 214 ms    T Offset 437 ms    QTC Fredericia 474 ms   CBC    Collection Time: 09/13/23  4:56 AM   Result Value Ref Range    WBC 11.4 (H) 4.4 - 11.3 x10E9/L    nRBC 0.0 0.0 - 0.0 /100 WBC    RBC 4.45 (L) 4.50 - 5.90 x10E12/L    Hemoglobin 12.7 (L) 13.5 - 17.5 g/dL    Hematocrit 39.7 (L) 41.0 - 52.0 %    MCV 89 80 - 100 fL    MCHC 32.0 32.0 - 36.0 g/dL    Platelets 140 (L) 150 - 450 x10E9/L    RDW 15.2 (H) 11.5 - 14.5 %   Magnesium    Collection Time: 09/13/23  4:56 AM   Result Value Ref Range    Magnesium 2.51 (H) 1.60 - 2.40 mg/dL   Renal Function Panel    Collection Time: 09/13/23  4:56 AM   Result Value Ref Range    Glucose 102 (H) 74 - 99 mg/dL    Sodium 137 136 - 145 mmol/L    Potassium 4.4 3.5 - 5.3 mmol/L    Chloride 102 98 - 107 mmol/L    Bicarbonate 29 21 - 32 mmol/L    Anion Gap 10 10 - 20 mmol/L    Urea Nitrogen 31 (H) 6 - 23 mg/dL    Creatinine 1.30 0.50 - 1.30 mg/dL    GFR MALE 59 (A) >90 mL/min/1.73m2    Calcium 9.1 8.6 - 10.6 mg/dL    Phosphorus 3.4 2.5 - 4.9 mg/dL    Albumin 3.7 3.4 - 5.0 g/dL   Coagulation Screen    Collection Time: 09/13/23  4:56 AM   Result Value Ref Range    Protime 12.8 9.8 - 12.8 sec    INR 1.1 0.9 - 1.1    aPTT 32 27 - 38 sec   POCT GLUCOSE    Collection Time: 09/13/23  7:01 AM   Result Value Ref Range    POCT Glucose 137 (H) 74 - 99 mg/dL   CBC    Collection Time: 09/13/23  9:30 AM   Result Value Ref Range    WBC CANCELED     nRBC CANCELED     RBC CANCELED     Hemoglobin CANCELED     Hematocrit CANCELED     MCV CANCELED     MCHC CANCELED     Platelets CANCELED      RDW CANCELED    Basic Metabolic Panel    Collection Time: 09/18/23 12:36 PM   Result Value Ref Range    Glucose 174 (H) 74 - 99 mg/dL    Sodium 135 (L) 136 - 145 mmol/L    Potassium 4.2 3.5 - 5.3 mmol/L    Chloride 101 98 - 107 mmol/L    Bicarbonate 26 21 - 32 mmol/L    Anion Gap 12 10 - 20 mmol/L    Urea Nitrogen 31 (H) 6 - 23 mg/dL    Creatinine 1.59 (H) 0.50 - 1.30 mg/dL    GFR MALE 46 (A) >90 mL/min/1.73m2    Calcium 9.1 8.6 - 10.3 mg/dL   CBC    Collection Time: 09/18/23 12:36 PM   Result Value Ref Range    WBC 10.7 4.4 - 11.3 x10E9/L    RBC 4.50 4.50 - 5.90 x10E12/L    Hemoglobin 12.8 (L) 13.5 - 17.5 g/dL    Hematocrit 39.9 (L) 41.0 - 52.0 %    MCV 89 80 - 100 fL    MCHC 32.1 32.0 - 36.0 g/dL    Platelets 149 (L) 150 - 450 x10E9/L    RDW 16.0 (H) 11.5 - 14.5 %   Protime-INR    Collection Time: 09/18/23 12:36 PM   Result Value Ref Range    Protime 15.3 (H) 9.8 - 12.8 sec    INR 1.4 (H) 0.9 - 1.1       ECG  No results found for this or any previous visit (from the past 4464 hour(s)).    Echocardiogram  No results found for this or any previous visit from the past 1095 days.      Problem List Items Addressed This Visit       (HFpEF) heart failure with preserved ejection fraction (CMS/HCC) - Primary    Relevant Orders    ECG 12 Lead (Completed)    Basic Metabolic Panel    CBC    Magnesium    Follow Up In Cardiology    B-Type Natriuretic Peptide    Ascending aorta dilatation (CMS/HCC)    Relevant Orders    ECG 12 Lead (Completed)    Basic Metabolic Panel    CBC    Magnesium    Follow Up In Cardiology    B-Type Natriuretic Peptide    Benign essential hypertension    Relevant Orders    ECG 12 Lead (Completed)    Basic Metabolic Panel    CBC    Magnesium    Follow Up In Cardiology    B-Type Natriuretic Peptide    CAD (coronary artery disease)    Relevant Orders    ECG 12 Lead (Completed)    Basic Metabolic Panel    CBC    Magnesium    Follow Up In Cardiology    B-Type Natriuretic Peptide    Diabetes mellitus, type 2  (CMS/[facility])    Relevant Orders    ECG 12 Lead (Completed)    Basic Metabolic Panel    CBC    Magnesium    Follow Up In Cardiology    B-Type Natriuretic Peptide    Hyperlipidemia    Relevant Orders    ECG 12 Lead (Completed)    Basic Metabolic Panel    CBC    Magnesium    Follow Up In Cardiology    B-Type Natriuretic Peptide    Nonrheumatic aortic valve stenosis    Relevant Orders    ECG 12 Lead (Completed)    Basic Metabolic Panel    CBC    Magnesium    Follow Up In Cardiology    B-Type Natriuretic Peptide    Persistent atrial fibrillation (CMS/[facility])    Relevant Orders    ECG 12 Lead (Completed)    Basic Metabolic Panel    CBC    Magnesium    Follow Up In Cardiology    B-Type Natriuretic Peptide    On rivaroxaban therapy    Relevant Orders    ECG 12 Lead (Completed)    Basic Metabolic Panel    CBC    Magnesium    Follow Up In Cardiology    B-Type Natriuretic Peptide    Morbid obesity (CMS/[facility])    Relevant Orders    ECG 12 Lead (Completed)    Basic Metabolic Panel    CBC    Magnesium    Follow Up In Cardiology    B-Type Natriuretic Peptide    SOBOE (shortness of breath on exertion)    Relevant Orders    Basic Metabolic Panel    CBC    Magnesium    Follow Up In Cardiology    B-Type Natriuretic Peptide            Artem Bauer DO, FACC, FACOI

## 2023-10-12 ENCOUNTER — TELEPHONE (OUTPATIENT)
Dept: CARDIOLOGY | Facility: HOSPITAL | Age: 73
End: 2023-10-12
Payer: MEDICARE

## 2023-10-12 ENCOUNTER — APPOINTMENT (OUTPATIENT)
Dept: CARDIOLOGY | Facility: HOSPITAL | Age: 73
End: 2023-10-12
Payer: MEDICARE

## 2023-10-12 DIAGNOSIS — I48.19 PERSISTENT ATRIAL FIBRILLATION (MULTI): Primary | ICD-10-CM

## 2023-10-12 PROBLEM — Z95.2 S/P TAVR (TRANSCATHETER AORTIC VALVE REPLACEMENT): Status: ACTIVE | Noted: 2023-10-12

## 2023-10-12 PROBLEM — E87.6 HYPOKALEMIA: Status: ACTIVE | Noted: 2022-04-25

## 2023-10-12 PROBLEM — L03.90 CELLULITIS: Status: ACTIVE | Noted: 2023-10-12

## 2023-10-12 PROBLEM — R53.83 FATIGUE: Status: ACTIVE | Noted: 2023-10-12

## 2023-10-12 PROBLEM — I50.32 CHRONIC DIASTOLIC HEART FAILURE (MULTI): Status: ACTIVE | Noted: 2022-04-25

## 2023-10-12 PROBLEM — R31.9 PAINLESS HEMATURIA: Status: ACTIVE | Noted: 2023-10-12

## 2023-10-12 PROBLEM — R06.09 DYSPNEA ON EXERTION: Status: ACTIVE | Noted: 2023-10-12

## 2023-10-12 PROBLEM — R06.02 EXERCISE-INDUCED SHORTNESS OF BREATH: Status: ACTIVE | Noted: 2023-10-12

## 2023-10-12 PROBLEM — R97.20 ELEVATED PSA: Status: ACTIVE | Noted: 2023-10-12

## 2023-10-12 RX ORDER — RIVAROXABAN 20 MG/1
20 TABLET, FILM COATED ORAL
Qty: 30 TABLET | Refills: 1 | Status: SHIPPED | OUTPATIENT
Start: 2023-10-12 | End: 2023-11-14 | Stop reason: SDUPTHER

## 2023-10-13 ENCOUNTER — OFFICE VISIT (OUTPATIENT)
Dept: CARDIOLOGY | Facility: CLINIC | Age: 73
End: 2023-10-13
Payer: MEDICARE

## 2023-10-13 VITALS
DIASTOLIC BLOOD PRESSURE: 72 MMHG | HEIGHT: 74 IN | SYSTOLIC BLOOD PRESSURE: 112 MMHG | WEIGHT: 315 LBS | BODY MASS INDEX: 40.43 KG/M2 | HEART RATE: 62 BPM

## 2023-10-13 DIAGNOSIS — E78.5 HYPERLIPIDEMIA, UNSPECIFIED HYPERLIPIDEMIA TYPE: ICD-10-CM

## 2023-10-13 DIAGNOSIS — I50.32 CHRONIC HEART FAILURE WITH PRESERVED EJECTION FRACTION (MULTI): Primary | ICD-10-CM

## 2023-10-13 DIAGNOSIS — R06.02 SOBOE (SHORTNESS OF BREATH ON EXERTION): ICD-10-CM

## 2023-10-13 DIAGNOSIS — I25.10 CORONARY ARTERY DISEASE INVOLVING NATIVE CORONARY ARTERY OF NATIVE HEART, UNSPECIFIED WHETHER ANGINA PRESENT: ICD-10-CM

## 2023-10-13 DIAGNOSIS — Z79.01 ON RIVAROXABAN THERAPY: ICD-10-CM

## 2023-10-13 DIAGNOSIS — E11.42 TYPE 2 DIABETES MELLITUS WITH DIABETIC POLYNEUROPATHY, WITHOUT LONG-TERM CURRENT USE OF INSULIN (MULTI): ICD-10-CM

## 2023-10-13 DIAGNOSIS — I48.19 PERSISTENT ATRIAL FIBRILLATION (MULTI): ICD-10-CM

## 2023-10-13 DIAGNOSIS — I35.0 NONRHEUMATIC AORTIC VALVE STENOSIS: ICD-10-CM

## 2023-10-13 DIAGNOSIS — I77.810 ASCENDING AORTA DILATATION (CMS-HCC): ICD-10-CM

## 2023-10-13 DIAGNOSIS — I10 BENIGN ESSENTIAL HYPERTENSION: ICD-10-CM

## 2023-10-13 DIAGNOSIS — E66.01 MORBID OBESITY (MULTI): ICD-10-CM

## 2023-10-13 PROCEDURE — 4010F ACE/ARB THERAPY RXD/TAKEN: CPT | Performed by: INTERNAL MEDICINE

## 2023-10-13 PROCEDURE — 1159F MED LIST DOCD IN RCRD: CPT | Performed by: INTERNAL MEDICINE

## 2023-10-13 PROCEDURE — 1036F TOBACCO NON-USER: CPT | Performed by: INTERNAL MEDICINE

## 2023-10-13 PROCEDURE — 3078F DIAST BP <80 MM HG: CPT | Performed by: INTERNAL MEDICINE

## 2023-10-13 PROCEDURE — 3044F HG A1C LEVEL LT 7.0%: CPT | Performed by: INTERNAL MEDICINE

## 2023-10-13 PROCEDURE — 93000 ELECTROCARDIOGRAM COMPLETE: CPT | Performed by: INTERNAL MEDICINE

## 2023-10-13 PROCEDURE — 99214 OFFICE O/P EST MOD 30 MIN: CPT | Performed by: INTERNAL MEDICINE

## 2023-10-13 PROCEDURE — 3074F SYST BP LT 130 MM HG: CPT | Performed by: INTERNAL MEDICINE

## 2023-10-13 PROCEDURE — 3008F BODY MASS INDEX DOCD: CPT | Performed by: INTERNAL MEDICINE

## 2023-10-26 ENCOUNTER — HOSPITAL ENCOUNTER (OUTPATIENT)
Dept: CARDIOLOGY | Facility: HOSPITAL | Age: 73
Discharge: HOME | End: 2023-10-26
Payer: MEDICARE

## 2023-10-26 DIAGNOSIS — Z95.2 PRESENCE OF PROSTHETIC HEART VALVE: ICD-10-CM

## 2023-10-26 LAB — EJECTION FRACTION APICAL 4 CHAMBER: 46.2

## 2023-10-26 PROCEDURE — 93306 TTE W/DOPPLER COMPLETE: CPT | Performed by: INTERNAL MEDICINE

## 2023-10-26 PROCEDURE — 93306 TTE W/DOPPLER COMPLETE: CPT

## 2023-10-26 PROCEDURE — 2500000004 HC RX 250 GENERAL PHARMACY W/ HCPCS (ALT 636 FOR OP/ED): Performed by: INTERNAL MEDICINE

## 2023-10-26 RX ADMIN — HUMAN ALBUMIN MICROSPHERES AND PERFLUTREN 0.5 ML: 10; .22 INJECTION, SOLUTION INTRAVENOUS at 14:33

## 2023-11-14 DIAGNOSIS — I48.19 PERSISTENT ATRIAL FIBRILLATION (MULTI): ICD-10-CM

## 2023-11-20 DIAGNOSIS — E11.42 TYPE 2 DIABETES MELLITUS WITH DIABETIC POLYNEUROPATHY, WITHOUT LONG-TERM CURRENT USE OF INSULIN (MULTI): Primary | ICD-10-CM

## 2023-11-20 RX ORDER — BLOOD-GLUCOSE METER
EACH MISCELLANEOUS
Qty: 100 EACH | Refills: 3 | Status: SHIPPED | OUTPATIENT
Start: 2023-11-20

## 2023-11-20 NOTE — TELEPHONE ENCOUNTER
Rx Refill Request Telephone Encounter    Name:  Fermin Coronado  :  [id number]  Medication Name:      OneTouch Verio test strips strip   Sig: USE 1 STRIP DAILY.             Specific Pharmacy location:   [facility] #[facility] [address]         Date of last appointment:  23  Date of next appointment: 24   Best number to reach patient: [phone]

## 2024-01-11 ENCOUNTER — APPOINTMENT (OUTPATIENT)
Dept: CARDIOLOGY | Facility: HOSPITAL | Age: 74
End: 2024-01-11
Payer: MEDICARE

## 2024-01-23 ENCOUNTER — LAB (OUTPATIENT)
Dept: LAB | Facility: LAB | Age: 74
End: 2024-01-23
Payer: MEDICARE

## 2024-01-23 DIAGNOSIS — E11.42 TYPE 2 DIABETES MELLITUS WITH DIABETIC POLYNEUROPATHY, WITHOUT LONG-TERM CURRENT USE OF INSULIN (MULTI): ICD-10-CM

## 2024-01-23 DIAGNOSIS — E79.0 ASYMPTOMATIC HYPERURICEMIA: ICD-10-CM

## 2024-01-23 LAB
ALBUMIN SERPL BCP-MCNC: 4 G/DL (ref 3.4–5)
ALP SERPL-CCNC: 108 U/L (ref 33–136)
ALT SERPL W P-5'-P-CCNC: 19 U/L (ref 10–52)
ANION GAP SERPL CALC-SCNC: 15 MMOL/L (ref 10–20)
AST SERPL W P-5'-P-CCNC: 19 U/L (ref 9–39)
BILIRUB SERPL-MCNC: 0.9 MG/DL (ref 0–1.2)
BUN SERPL-MCNC: 33 MG/DL (ref 6–23)
CALCIUM SERPL-MCNC: 9.2 MG/DL (ref 8.6–10.3)
CHLORIDE SERPL-SCNC: 98 MMOL/L (ref 98–107)
CHOLEST SERPL-MCNC: 97 MG/DL (ref 0–199)
CHOLESTEROL/HDL RATIO: 2.6
CO2 SERPL-SCNC: 27 MMOL/L (ref 21–32)
CREAT SERPL-MCNC: 1.4 MG/DL (ref 0.5–1.3)
EGFRCR SERPLBLD CKD-EPI 2021: 54 ML/MIN/1.73M*2
GLUCOSE SERPL-MCNC: 156 MG/DL (ref 74–99)
HDLC SERPL-MCNC: 37.9 MG/DL
LDLC SERPL CALC-MCNC: 35 MG/DL
NON HDL CHOLESTEROL: 59 MG/DL (ref 0–149)
POTASSIUM SERPL-SCNC: 4.2 MMOL/L (ref 3.5–5.3)
PROT SERPL-MCNC: 7.5 G/DL (ref 6.4–8.2)
SODIUM SERPL-SCNC: 136 MMOL/L (ref 136–145)
TRIGL SERPL-MCNC: 120 MG/DL (ref 0–149)
URATE SERPL-MCNC: 6.8 MG/DL (ref 4–7.5)
VLDL: 24 MG/DL (ref 0–40)

## 2024-01-23 PROCEDURE — 80061 LIPID PANEL: CPT

## 2024-01-23 PROCEDURE — 36415 COLL VENOUS BLD VENIPUNCTURE: CPT

## 2024-01-23 PROCEDURE — 80053 COMPREHEN METABOLIC PANEL: CPT

## 2024-01-23 PROCEDURE — 84550 ASSAY OF BLOOD/URIC ACID: CPT

## 2024-01-23 PROCEDURE — 83036 HEMOGLOBIN GLYCOSYLATED A1C: CPT

## 2024-01-24 LAB
EST. AVERAGE GLUCOSE BLD GHB EST-MCNC: 146 MG/DL
HBA1C MFR BLD: 6.7 %

## 2024-01-25 ENCOUNTER — OFFICE VISIT (OUTPATIENT)
Dept: PRIMARY CARE | Facility: CLINIC | Age: 74
End: 2024-01-25
Payer: MEDICARE

## 2024-01-25 VITALS
TEMPERATURE: 96.3 F | OXYGEN SATURATION: 91 % | RESPIRATION RATE: 16 BRPM | DIASTOLIC BLOOD PRESSURE: 83 MMHG | HEART RATE: 66 BPM | SYSTOLIC BLOOD PRESSURE: 131 MMHG | HEIGHT: 74 IN | BODY MASS INDEX: 40.43 KG/M2 | WEIGHT: 315 LBS

## 2024-01-25 DIAGNOSIS — I48.19 PERSISTENT ATRIAL FIBRILLATION (MULTI): ICD-10-CM

## 2024-01-25 DIAGNOSIS — Z00.00 MEDICARE ANNUAL WELLNESS VISIT, SUBSEQUENT: Primary | ICD-10-CM

## 2024-01-25 DIAGNOSIS — G47.33 OSA ON CPAP: ICD-10-CM

## 2024-01-25 DIAGNOSIS — E11.42 TYPE 2 DIABETES MELLITUS WITH DIABETIC POLYNEUROPATHY, WITHOUT LONG-TERM CURRENT USE OF INSULIN (MULTI): ICD-10-CM

## 2024-01-25 DIAGNOSIS — E11.42 DIABETIC POLYNEUROPATHY ASSOCIATED WITH TYPE 2 DIABETES MELLITUS (MULTI): ICD-10-CM

## 2024-01-25 DIAGNOSIS — I77.810 ASCENDING AORTA DILATATION (CMS-HCC): ICD-10-CM

## 2024-01-25 DIAGNOSIS — E66.01 CLASS 3 SEVERE OBESITY DUE TO EXCESS CALORIES WITH SERIOUS COMORBIDITY AND BODY MASS INDEX (BMI) OF 50.0 TO 59.9 IN ADULT (MULTI): ICD-10-CM

## 2024-01-25 DIAGNOSIS — I50.32 CHRONIC HEART FAILURE WITH PRESERVED EJECTION FRACTION (MULTI): ICD-10-CM

## 2024-01-25 DIAGNOSIS — N40.1 BPH WITH OBSTRUCTION/LOWER URINARY TRACT SYMPTOMS: ICD-10-CM

## 2024-01-25 DIAGNOSIS — Z95.2 S/P TAVR (TRANSCATHETER AORTIC VALVE REPLACEMENT): ICD-10-CM

## 2024-01-25 DIAGNOSIS — N13.8 BPH WITH OBSTRUCTION/LOWER URINARY TRACT SYMPTOMS: ICD-10-CM

## 2024-01-25 DIAGNOSIS — E79.0 ASYMPTOMATIC HYPERURICEMIA: ICD-10-CM

## 2024-01-25 PROCEDURE — 3044F HG A1C LEVEL LT 7.0%: CPT | Performed by: INTERNAL MEDICINE

## 2024-01-25 PROCEDURE — 1036F TOBACCO NON-USER: CPT | Performed by: INTERNAL MEDICINE

## 2024-01-25 PROCEDURE — 1123F ACP DISCUSS/DSCN MKR DOCD: CPT | Performed by: INTERNAL MEDICINE

## 2024-01-25 PROCEDURE — 1170F FXNL STATUS ASSESSED: CPT | Performed by: INTERNAL MEDICINE

## 2024-01-25 PROCEDURE — G0439 PPPS, SUBSEQ VISIT: HCPCS | Performed by: INTERNAL MEDICINE

## 2024-01-25 PROCEDURE — 99214 OFFICE O/P EST MOD 30 MIN: CPT | Performed by: INTERNAL MEDICINE

## 2024-01-25 PROCEDURE — 3075F SYST BP GE 130 - 139MM HG: CPT | Performed by: INTERNAL MEDICINE

## 2024-01-25 PROCEDURE — 3048F LDL-C <100 MG/DL: CPT | Performed by: INTERNAL MEDICINE

## 2024-01-25 PROCEDURE — 1159F MED LIST DOCD IN RCRD: CPT | Performed by: INTERNAL MEDICINE

## 2024-01-25 PROCEDURE — 4010F ACE/ARB THERAPY RXD/TAKEN: CPT | Performed by: INTERNAL MEDICINE

## 2024-01-25 PROCEDURE — 3008F BODY MASS INDEX DOCD: CPT | Performed by: INTERNAL MEDICINE

## 2024-01-25 PROCEDURE — 3079F DIAST BP 80-89 MM HG: CPT | Performed by: INTERNAL MEDICINE

## 2024-01-25 PROCEDURE — 1158F ADVNC CARE PLAN TLK DOCD: CPT | Performed by: INTERNAL MEDICINE

## 2024-01-25 RX ORDER — GABAPENTIN 300 MG/1
300 CAPSULE ORAL NIGHTLY
Qty: 90 CAPSULE | Refills: 1 | Status: SHIPPED | OUTPATIENT
Start: 2024-01-25 | End: 2024-07-23

## 2024-01-25 RX ORDER — METFORMIN HYDROCHLORIDE 750 MG/1
750 TABLET, EXTENDED RELEASE ORAL DAILY
Qty: 90 TABLET | Refills: 1 | Status: SHIPPED | OUTPATIENT
Start: 2024-01-25 | End: 2024-07-23

## 2024-01-25 RX ORDER — ALLOPURINOL 100 MG/1
100 TABLET ORAL DAILY
Qty: 90 TABLET | Refills: 1 | Status: SHIPPED | OUTPATIENT
Start: 2024-01-25 | End: 2024-07-23

## 2024-01-25 ASSESSMENT — PATIENT HEALTH QUESTIONNAIRE - PHQ9
1. LITTLE INTEREST OR PLEASURE IN DOING THINGS: NOT AT ALL
SUM OF ALL RESPONSES TO PHQ9 QUESTIONS 1 AND 2: 0
2. FEELING DOWN, DEPRESSED OR HOPELESS: NOT AT ALL

## 2024-01-25 ASSESSMENT — ACTIVITIES OF DAILY LIVING (ADL)
DRESSING: INDEPENDENT
MANAGING_FINANCES: INDEPENDENT
BATHING: INDEPENDENT
GROCERY_SHOPPING: INDEPENDENT
TAKING_MEDICATION: INDEPENDENT
DRESSING: INDEPENDENT
DOING_HOUSEWORK: INDEPENDENT
BATHING: INDEPENDENT

## 2024-01-25 ASSESSMENT — ENCOUNTER SYMPTOMS
LOSS OF SENSATION IN FEET: 1
DEPRESSION: 0
OCCASIONAL FEELINGS OF UNSTEADINESS: 1

## 2024-01-25 NOTE — PROGRESS NOTES
"Subjective   Patient ID: Fermin Coronado is a 73 y.o. male who presents for Medicare Annual Wellness Visit Subsequent (Medicare and 6 month follow up).  HPI    Review of Systems    Objective     Blood pressure 131/83, pulse 66, temperature 35.7 °C (96.3 °F), temperature source Temporal, resp. rate 16, height 1.88 m (6' 2\"), weight (!) 180 kg (397 lb 6.4 oz), SpO2 91 %.   Physical Exam    Assessment/Plan   Problem List Items Addressed This Visit       (HFpEF) heart failure with preserved ejection fraction (CMS/HCC)    Relevant Orders    CBC and Auto Differential    Ascending aorta dilatation (CMS/[facility])    Asymptomatic hyperuricemia    Relevant Medications    allopurinol (Zyloprim) 100 mg tablet    Other Relevant Orders    Uric Acid    BPH with obstruction/lower urinary tract symptoms    Relevant Orders    Prostate Specific Antigen    Diabetes mellitus, type 2 (CMS/[facility])    Relevant Medications    metFORMIN XR (Glucophage-XR) 750 mg 24 hr tablet    Other Relevant Orders    Comprehensive Metabolic Panel    Lipid Panel    Hemoglobin A1C    Diabetic neuropathy (CMS/[facility])    Relevant Medications    gabapentin (Neurontin) 300 mg capsule    TAMARA on CPAP    Relevant Orders    CBC and Auto Differential    Persistent atrial fibrillation (CMS/[facility])    S/P TAVR (transcatheter aortic valve replacement)     Other Visit Diagnoses       Medicare annual wellness visit, subsequent    -  Primary    Class 3 severe obesity due to excess calories with serious comorbidity and body mass index (BMI) of 50.0 to 59.9 in adult (CMS/[facility])              Medicare annual wellness completed with no new findings or issues identified.  Patient has documented advanced care planning and POA in place  Sugars well controlled overall with A1c at goal.  Will continue present medical therapy and follow up.  Uric acid level in normal range on therapy and no Sx of gout of nephrolithiasis.  Neuropathy Sx well compensated on current therapy.  Will continue present " therapy and follow.  He follows with Cardiology re:  a-fib and HTN and other cardiac issues.  Pt. continues to use BiPAP nightly and is tolerating well.  Advised to continue treatment and will continue to follow clinically.  Pt. advised on improving dietary choices and portion control as well as increasing exercise to promote weight loss.    Follow up in 6 months  Lab to be drawn 1 week prior to next office visit.

## 2024-04-02 NOTE — PROGRESS NOTES
[facility] Heart and Vascular Cardiology    Patient Name: Fermin Coronado  Patient : 1950      Scribe Attestation  By signing my name below, I, Nella Moran   attest that this documentation has been prepared under the direction and in the presence of Artem Bauer DO.      Reason for visit:  This is a 73-year-old male here for follow-up regarding HFpEF/shortness of breath, persistent atrial fibrillation, anticoagulation with rivaroxaban, aortic valve stenosis status post TAVR done in 2023, dilated ascending aorta measured at 4.4 cm, mild diffuse coronary artery disease as seen on cardiac catheterization done in 2023, hypertension, dyslipidemia, diabetes mellitus, and morbid obesity.     HPI:  This is a 73-year-old male here for follow-up regarding HFpEF/shortness of breath, persistent atrial fibrillation, anticoagulation with rivaroxaban, aortic valve stenosis status post TAVR done in 2023, dilated ascending aorta measured at 4.4 cm, mild diffuse coronary artery disease as seen on cardiac catheterization done in 2023, hypertension, dyslipidemia, diabetes mellitus, and morbid obesity.  The patient was last evaluated by me in 2023.  At that visit I discussed possible chest x-ray which the patient declined, ordered blood work including BMP/CBC/magnesium be drawn in 6 months, and asked the patient to follow-up in 6 months and sooner if necessary.  CMP done in 2024 showed normal serum sodium and potassium with a serum creatinine of 1.40 consistent with known CKD, normal ALT/AST, hemoglobin A1c was 6.7%.  Lipid panel done in 2024 showed an LDL cholesterol of 35 and triglycerides of 120 while on atorvastatin 40 mg daily.  CBC done in 2023 was 12.8.  Echocardiogram done in 2023 showed low normal left ventricular systolic function with an ejection fraction of 50 to 55%, poorly visualized right ventricle, TAVR with a mean  aortic valve gradient of 8.2 mmHg and a dimensionless index of 0.43. ECG done today showed atrial fibrillation with a heart rate of 56 bpm.  The patient reports coldness on the right lower extremity but denies open wound. He states that the previously reported shortness of breath had been stable. He denies any new chest pain, palpitations and lightheadedness. He states that he takes all of his medications as prescribed. During my exam, he was resting comfortably on the exam table.          Assessment/Plan:   1. HFpEF/shortness of breath  The patient has a history of HFpEF.  Echocardiogram done in October 2023 showed low normal left ventricular systolic function with an ejection fraction of 50 to 55%, poorly visualized right ventricle, TAVR with a mean aortic valve gradient of 8.2 mmHg and a dimensionless index of 0.43.   The previously reported shortness of breath remains stable since the last visit. Shortness of breath appears multifactorial including weight gain, aging and deconditioning.  He does not appear significantly volume overloaded on exam today except for trace to 1+ pitting bilateral lower extremity edema.  He should continue current cardiac medications.  Recent lab works as noted in the HPI.  Echocardiogram and lab works will be updated in 6 months.  I discussed with him the importance of following a low-sodium heart healthy diet as well as weight loss, wearing compression stockings and elevating legs when seated.   Follow up in 6 months and sooner if necessary.      2. Persistent atrial fibrillation  The patient has a history of persistent atrial fibrillation, on rivaroxaban for thromboembolism prophylaxis, which should be continued.  He should continue metoprolol succinate for heart rate control.  ECG done today showed atrial fibrillation with a heart rate of 56 bpm.    He denies palpitations or lightheadedness.   No plans for rhythm control approach at this time.  Echocardiogram done in October 2023  showed low normal left ventricular systolic function with an ejection fraction of 50 to 55%, poorly visualized right ventricle, TAVR with a mean aortic valve gradient of 8.2 mmHg and a dimensionless index of 0.43.   Recent lab works as noted in the HPI.   Echocardiogram and lab works will be updated in 6 months.  Follow up in 6 months and sooner if necessary.      3. Anticoagulation with rivaroxaban  The patient is on anticoagulation with rivaroxaban for atrial fibrillation.  Recent lab works as noted in the HPI.  Lab works as noted below will be done in 6 months prior to his next visit.      4. Aortic valve stenosis  The patient has a history of aortic valve stenosis status post TAVR.  He underwent TAVR on 9/12/2023 with an evolute fracture 34 mm valve.   The previously reported shortness breath on exertion which has been generally stable since the last visit.   Shortness of breath could be multifactorial including, aging, deconditioning, obesity and atrial fibrillation.  Echocardiogram done in October 2023 showed low normal left ventricular systolic function with an ejection fraction of 50 to 55%, poorly visualized right ventricle, TAVR with a mean aortic valve gradient of 8.2 mmHg and a dimensionless index of 0.43.   Echocardiogram will be updated in 6 months.  He should continue to follow with the [facility].      5. Dilated ascending aorta  The patient was noted to have a moderately dilated ascending aorta measured at 4.4 cm on prior echocardiogram.  Recent echocardiogram as noted above.  Echocardiogram will be updated in 6 months.  Continue risk factor modification.    6. Coronary artery disease  The patient has a history of mild diffuse coronary artery disease as seen on cardiac catheterization done in August 2023.  ECG done today showed atrial fibrillation with a heart rate of 56 bpm.    He denies anginal chest discomfort.  Blood pressure appears controlled on exam today.  He should continue  current antihypertensive medications and antiplatelet therapy.  Echocardiogram done in October 2023 showed low normal left ventricular systolic function with an ejection fraction of 50 to 55%, poorly visualized right ventricle, TAVR with a mean aortic valve gradient of 8.2 mmHg and a dimensionless index of 0.43.   Recent lab works as noted in the HPI.  Lipid panel done in January 2024 showed an LDL cholesterol of 35 and triglycerides of 120 while on atorvastatin 40 mg daily.  Echocardiogram and lab works will be updated in 6 months.  Please see lifestyle recommendations below.  Follow up in 6 months and sooner if necessary.      7. Hypertension  Patient has a history of hypertension which appears controlled on exam today.  He should continue his current antihypertensive medications.      8. Dyslipidemia  Lipid panel done in January 2024 showed an LDL cholesterol of 35 and triglycerides of 120 while on atorvastatin 40 mg daily.  Please see lifestyle recommendations below.      9. Diabetes Mellitus  Hemoglobin A1c done in January 2024 was 6.7%.  Medication management as per PCP.     10. Morbid obesity  Please see lifestyle recommendations below.      11.  Right leg pain/complaint of right leg coldness.  The patient reports pain/coldness on the right lower extremity but denies open wound.  PVR was ordered as noted below.       Orders:   PVR   Echocardiogram in 6 months,   BMP/CBC/Mg in 6 months  Follow-up in 6 months.      Lifestyle Recommendations  I recommend a whole-food plant-based diet, an eating pattern that encourages the consumption of unrefined plant foods (such as fruits, vegetables, tubers, whole grains, legumes, nuts and seeds) and discourages meats, dairy products, eggs and processed foods.     The AHA/ACC recommends that the patient consume a dietary pattern that emphasizes intake of vegetables, fruits, and whole grains; includes low-fat dairy products, poultry, fish, legumes, non-tropical vegetable oils,  and nuts; and limits intake of sodium, sweets, sugar-sweetened beverages, and red meats.  Adapt this dietary pattern to appropriate calorie requirements (a 500-750 kcal/day deficit to loose weight), personal and cultural food preferences, and nutrition therapy for other medical conditions (including diabetes).  Achieve this pattern by following plans such as the [place] Mediterranean, DASH dietary pattern, or AHA diet.     Engage in 2 hours and 30 minutes per week of moderate-intensity physical activity, or 1 hour and 15 minutes (75 minutes) per week of vigorous-intensity aerobic physical activity, or an equivalent combination of moderate and vigorous-intensity aerobic physical activity. Aerobic activity should be performed in episodes of at least 10 minutes preferably spread throughout the week.     Adhering to a heart healthy diet, regular exercise habits, avoidance of tobacco products, and maintenance of a healthy weight are crucial components of their heart disease risk reduction.     Any positive review of systems not specifically addressed in the office visit today should be evaluated and treated by the patients primary care physician or in an emergency department if necessary     Patient was notified that results from ordered tests will be called to the patient if it changes current management; it will otherwise be discussed at a future appointment and available on  [facility].     Thank you for allowing me to participate in the care of this patient.        This document was generated using the assistance of voice recognition software. If there are any errors of spelling, grammar, syntax, or meaning; please feel free to contact me directly for clarification.    Past Medical History:  He has a past medical history of Benign prostatic hyperplasia without lower urinary tract symptoms, Personal history of other endocrine, nutritional and metabolic disease, and Unspecified fracture of shaft of humerus, right arm,  initial encounter for closed fracture.    Past Surgical History:  He has a past surgical history that includes Tonsillectomy (09/13/2016); Cholecystectomy (09/13/2016); and Cardiac surgery.      Social History:  He reports that he quit smoking about 45 years ago. His smoking use included cigarettes. He has a 2.00 pack-year smoking history. He quit smokeless tobacco use about 44 years ago.  His smokeless tobacco use included chew. He reports that he does not currently use alcohol. He reports that he does not use drugs.    Family History:  Family History   Problem Relation Name Age of Onset    Hypertension Mother      Coronary artery disease Mother      Breast cancer Mother      Hypertension Father      Hypertension Sister      Diabetes Sister      Hyperlipidemia Sister          Allergies:  Patient has no known allergies.    Outpatient Medications:  Current Outpatient Medications   Medication Instructions    acetaminophen (TYLENOL) 650 mg, oral, Every 6 hours PRN    allopurinol (ZYLOPRIM) 100 mg, oral, Daily    aspirin 81 mg chewable tablet 1 tablet, oral, Daily    atorvastatin (LIPITOR) 40 mg, oral, Nightly    blood sugar diagnostic (OneTouch Verio test strips) strip USE 1 STRIP DAILY.  what ever brand covered by insurance as they prefer    cholecalciferol (Vitamin D-3) 25 MCG (1000 UT) tablet 1 tablet, oral, Daily    CRANBERRY ORAL 1 tablet, oral, Daily    doxazosin (CARDURA) 8 mg, oral, Daily    fish oil concentrate (Omega-3) 120-180 mg capsule 1 capsule, oral, Daily    furosemide (Lasix) 40 mg tablet 1 tablet, oral, 2 times daily    gabapentin (NEURONTIN) 300 mg, oral, Nightly    Jardiance 25 mg, oral, Daily    losartan (Cozaar) 25 mg tablet 1 tablet, oral, Daily    metFORMIN XR (GLUCOPHAGE-XR) 750 mg, oral, Daily    metoprolol succinate XL (Toprol-XL) 100 mg 24 hr tablet 1 tablet, oral, Daily    multivitamin with folic acid (One Daily Multivitamin) 400 mcg tablet 1 tablet, oral, Daily    mupirocin (Bactroban) 2 %  "ointment APPLY TO LEGS WITH TRIAMCINOLONE DAILY    nettle-pumpkin-saw palm-min 17 (Prostate Therapy) capsule oral, Daily    potassium chloride CR (Klor-Con M20) 20 mEq ER tablet 1 tablet, oral, Daily    rivaroxaban (XARELTO) 20 mg, oral, Daily with evening meal    spironolactone (Aldactone) 25 mg tablet 1 tablet, oral, Daily    triamcinolone (Kenalog) 0.1 % cream APPLY DAILY TO LEGS.        ROS:  A 14 point review of systems was done and is negative other than as stated in HPI    Vitals:      7/27/2023     1:04 PM 8/8/2023     9:24 AM 8/8/2023     9:34 AM 8/15/2023     9:51 AM 9/26/2023     1:16 PM 10/13/2023    12:35 PM 1/25/2024     1:16 PM   Vitals   Systolic 118   120 101 112 131   Diastolic 60   82 66 72 83   Heart Rate 62   66 56 62 66   Temp 36.4 °C (97.6 °F)      35.7 °C (96.3 °F)   Resp     20  16   Height (in) 1.791 m (5' 10.5\") 1.852 m (6' 0.91\") 1.851 m (6' 0.87\") 1.829 m (6')  1.88 m (6' 2\") 1.88 m (6' 2\")   Weight (lb) 395.6 385.81 385.81 390 395 396 397.4   BMI 55.96 kg/m2 51.02 kg/m2 51.08 kg/m2 52.89 kg/m2 53.57 kg/m2 50.84 kg/m2 51.02 kg/m2   BSA (m2) 2.98 m2 3 m2 3 m2 3 m2 3.02 m2 3.07 m2 3.07 m2        Physical Exam:   Constitutional: Cooperative, in no acute distress, alert, appears stated age.  Skin: Skin color, texture, turgor normal. No rashes or lesions.  Head: Normocephalic. No masses, lesions, tenderness or abnormalities  Eyes: Extraocular movements are grossly intact.  Mouth and throat: Mucous membranes moist  Neck: Neck supple, no carotid bruits, no JVD  Respiratory: Lungs clear to auscultation, no wheezing or rhonchi, no use of accessory muscles  Chest wall: No scars, normal excursion with respiration  Cardiovascular: Irregular rhythm without murmur  Gastrointestinal: Abdomen soft, nontender. Bowel sounds normal.  Musculoskeletal: Strength equal in upper extremities  Extremities: Trace to 1+ pitting edema  Neurologic: Sensation grossly intact, alert and oriented ×3        Intake/Output: "   No intake/output data recorded.    Outpatient Medications  Current Outpatient Medications on File Prior to Visit   Medication Sig Dispense Refill    acetaminophen (Tylenol) 325 mg tablet Take 2 tablets (650 mg) by mouth every 6 hours if needed for mild pain (1 - 3).      allopurinol (Zyloprim) 100 mg tablet Take 1 tablet (100 mg) by mouth once daily. 90 tablet 1    aspirin 81 mg chewable tablet Chew 1 tablet (81 mg) once daily.      atorvastatin (Lipitor) 40 mg tablet Take 1 tablet (40 mg) by mouth once daily at bedtime.      blood sugar diagnostic (OneTouch Verio test strips) strip USE 1 STRIP DAILY.  what ever brand covered by insurance as they prefer 100 each 3    cholecalciferol (Vitamin D-3) 25 MCG (1000 UT) tablet Take 1 tablet (25 mcg) by mouth once daily.      CRANBERRY ORAL Take 1 tablet by mouth once daily.      doxazosin (Cardura) 8 mg tablet Take 1 tablet (8 mg) by mouth once daily.      fish oil concentrate (Omega-3) 120-180 mg capsule Take 1 capsule (1 g) by mouth once daily.      furosemide (Lasix) 40 mg tablet Take 1 tablet (40 mg) by mouth 2 times a day.      gabapentin (Neurontin) 300 mg capsule Take 1 capsule (300 mg) by mouth once daily at bedtime. 90 capsule 1    Jardiance 25 mg Take 1 tablet (25 mg) by mouth once daily.      losartan (Cozaar) 25 mg tablet Take 1 tablet (25 mg) by mouth once daily.      metFORMIN XR (Glucophage-XR) 750 mg 24 hr tablet Take 1 tablet (750 mg) by mouth once daily. 90 tablet 1    metoprolol succinate XL (Toprol-XL) 100 mg 24 hr tablet Take 1 tablet (100 mg) by mouth once daily.      multivitamin with folic acid (One Daily Multivitamin) 400 mcg tablet Take 1 tablet by mouth once daily.      mupirocin (Bactroban) 2 % ointment APPLY TO LEGS WITH TRIAMCINOLONE DAILY      nettle-pumpkin-saw palm-min 17 (Prostate Therapy) capsule Take by mouth once daily.      potassium chloride CR (Klor-Con M20) 20 mEq ER tablet Take 1 tablet (20 mEq) by mouth once daily.       rivaroxaban (Xarelto) 20 mg tablet Take 1 tablet (20 mg) by mouth once daily in the evening. Take with meals. 90 tablet 1    spironolactone (Aldactone) 25 mg tablet Take 1 tablet (25 mg) by mouth once daily.      triamcinolone (Kenalog) 0.1 % cream APPLY DAILY TO LEGS.       No current facility-administered medications on file prior to visit.       Labs: (past 26 weeks)  Recent Results (from the past 4368 hour(s))   Transthoracic Echo (TTE) Complete    Collection Time: 10/26/23  2:39 PM   Result Value Ref Range    LV A4C EF 46.2    Hemoglobin A1C    Collection Time: 01/23/24 10:02 AM   Result Value Ref Range    Hemoglobin A1C 6.7 (H) see below %    Estimated Average Glucose 146 Not Established mg/dL   Comprehensive Metabolic Panel    Collection Time: 01/23/24 10:02 AM   Result Value Ref Range    Glucose 156 (H) 74 - 99 mg/dL    Sodium 136 136 - 145 mmol/L    Potassium 4.2 3.5 - 5.3 mmol/L    Chloride 98 98 - 107 mmol/L    Bicarbonate 27 21 - 32 mmol/L    Anion Gap 15 10 - 20 mmol/L    Urea Nitrogen 33 (H) 6 - 23 mg/dL    Creatinine 1.40 (H) 0.50 - 1.30 mg/dL    eGFR 54 (L) >60 mL/min/1.73m*2    Calcium 9.2 8.6 - 10.3 mg/dL    Albumin 4.0 3.4 - 5.0 g/dL    Alkaline Phosphatase 108 33 - 136 U/L    Total Protein 7.5 6.4 - 8.2 g/dL    AST 19 9 - 39 U/L    Bilirubin, Total 0.9 0.0 - 1.2 mg/dL    ALT 19 10 - 52 U/L   Lipid Panel    Collection Time: 01/23/24 10:02 AM   Result Value Ref Range    Cholesterol 97 0 - 199 mg/dL    HDL-Cholesterol 37.9 mg/dL    Cholesterol/HDL Ratio 2.6     LDL Calculated 35 <=99 mg/dL    VLDL 24 0 - 40 mg/dL    Triglycerides 120 0 - 149 mg/dL    Non HDL Cholesterol 59 0 - 149 mg/dL   Uric acid    Collection Time: 01/23/24 10:02 AM   Result Value Ref Range    Uric Acid 6.8 4.0 - 7.5 mg/dL       ECG  Encounter Date: 10/13/23   ECG 12 Lead    Narrative    Atrial fibrillation, left bundle branch block, heart rate 62 bpm.       Echocardiogram  No results found for this or any previous visit from the  past 1095 days.      CV Studies:  EKG:  Encounter Date: 10/13/23   ECG 12 Lead    Narrative    Atrial fibrillation, left bundle branch block, heart rate 62 bpm.     Echocardiogram:   Echocardiogram     Narrative  [facility] (formerly [facility]3], [address] [place] [address]  Tel [phone] and Fax [phone]    TRANSTHORACIC ECHOCARDIOGRAM REPORT      Patient Name:     CORNELIO GARVEY CIARA [place] Physician:  [id number] Matheus Hitchcock MD  Study Date:       9/13/2023          Referring           KEYSHA GUERRA  Physician:  MRN/PID:          [id number]           PCP:  Accession/Order#: [id number]          [facility]VI Non  Location:           Invasive  YOB: 1950          Fellow:  Gender:           M                  Nurse:              Gisele Chacko RN  Admit Date:       9/12/2023          Sonographer:        SHLOMO Mcclain RDCS  Admission Status: Inpatient - Time   Additional Staff:  Critical  Height:           187.96 cm          CC Report to:       [address]  Weight:           178.26 kg          Study Type:         Echocardiogram  BSA:              2.90 m2  Blood Pressure: 132 /64 mmHg    Diagnosis/ICD: Z95.2-Presence of prosthetic heart valve  Indication:    s/p TAVR  Procedure/CPT: Echo Limited-55233; Color Doppler-68958; Doppler Limited-10210    Patient History:  Diabetes:          Yes  Pertinent History: HTN, A-Fib and CHF. TAMARA, morbid obesity, CKD, AS /p TAVR  (34mm Medtronic 9/12/23).    Study Detail: The following Echo studies were performed: 2D, M-Mode, Doppler and  color flow. Technically challenging study due to body habitus and  poor acoustic windows. Definity used as a contrast agent for  endocardial border definition. Total contrast used for this  procedure was 3 mL via IV push.      PHYSICIAN INTERPRETATION:  Left Ventricle: The left ventricular systolic function is normal, with an estimated ejection fraction of 60-65%. The left ventricular  cavity size is normal. Left ventricular diastolic filling was indeterminate.  Left Atrium: The left atrium was not well visualized.  Right Ventricle: The right ventricle was not well visualized. Unable to determine right ventricular systolic function.  Right Atrium: The right atrium was not well visualized.  Aortic Valve: The aortic valve was not well visualized. There is transcatheter aortic valve replacement. Echo findings are consistent with normal aortic valve prosthesis function. There is trivial aortic valve regurgitation. The peak instantaneous gradient of the aortic valve is 34.6 mmHg. The mean gradient of the aortic valve is 16.1 mmHg.  Mitral Valve: The mitral valve was not well visualized. There is trace mitral valve regurgitation.  Tricuspid Valve: The tricuspid valve was not well visualized. There is trace tricuspid regurgitation.  Pulmonic Valve: The pulmonic valve is not well visualized. There is trace pulmonic valve regurgitation.  Pericardium: There is a trivial pericardial effusion.  Aorta: The aortic root was not well visualized. The aortic root is at the upper limits of normal size.      CONCLUSIONS:  1. Poorly visualized anatomical structures due to very suboptimal image quality.  2. Left ventricular systolic function is normal with a 60-65% estimated ejection fraction.  3. There is a transcatheter aortic valve replacement.    QUANTITATIVE DATA SUMMARY:  AORTIC VALVE:  Normal Ranges:  AoV Vmax:                2.94 m/s  (<=1.7m/s)  AoV Peak P.6 mmHg (<20mmHg)  AoV Mean P.1 mmHg (1.7-11.5mmHg)  LVOT Max Herman:            0.97 m/s  (<=1.1m/s)  AoV VTI:                 50.12 cm  (18-25cm)  LVOT VTI:                18.13 cm  AoV Dimensionless Index: 0.36      [id number] Matheus Hitchcock MD  Electronically signed on 2023 at 11:29:15 AM         Final     Stress Testing IMESULT(WHQ9548:1:1825): No results found for this or any previous visit from the past   days.    Cardiac Catheterization:   Adult Cath     Narrative  [facility], Cath Lab, [address] [address]    Cardiovascular Catheterization Report    Patient Name:     CORNELIO URBINA      Performing Physician:  [id number] Peter Pollock MD  Study Date:       9/12/2023          Verifying Physician:   [id number] Pollock MD  MRN/PID:          [id number]           Cardiologist/Co-scrub:  Accession/Order#: [id number]          Fellow:                [id number] Jean Carlos Sanches MD  YOB: 1950          Fellow:  Gender:           M                  Referring Physician:   Artem Bauer DO  Surgeon:          Zoran Flowers MD Referring Physician:      Study: TAVR      Indications:  Severe aortic stenosis.    Transcatheter Aortic Valve Replacement (TAVR):  The right femoral artery was accessed using the transfemoral method. A 6 Fr sheath was inserted followed by the deployment of 2 Proglides. 14 Fr sentrant. The left femoral artery was accessed percutaneously and a 6 [place] contralateral sheath was placed. A 7 [place] temporary pacemaker was inserted through the right femoral vein and advanced to the right ventricular apex. Adequate pacing thresholds were obtained. Evolut Fx 34 mm valve was successfully deployed under rapid ventricular pacing at 160 BPM. Transthoracic echo performed post valve deployment revealed no central aortic insufficiency and trace/trivial paravalvular aortic insufficiency. No device related events. No bleeding events occurred during the procedure. No vascular access complications were revealed. Access site was closed using 2 ProGlide devices. Hemostasis was achieved in the left femoral artery using a ProGlide device. Temporary pacing wire was removed in lab.          Complications:  No vascular access complications were revealed. No bleeding events occurred during the procedure. No device related events.    Cardiac Cath Transition of Care Summary:  Post  Procedure Diagnosis: Successful TAVR with 34 mm Evolut Fx.  Blood Loss:               Estimated blood loss during the procedure was 0 mls.  Specimens Removed:        Number of specimen(s) removed: none.    ____________________________________________________________________________________  CONCLUSIONS:  1. Successful TAVR with 34 mm Evolut Fx.  2. Patient was enrolled in a research study and data was included in the TVT Registry.    ____________________________________________________________________________________  CPT Codes:  MILLY Perc,femoral-21460.62    ICD 10 Codes:  I35.0-Nonrheumatic aortic (valve) stenosis    [id number] Peter Pollock MD  Performing Physician  Electronically signed by [id number] Peter Pollock MD on 9/13/2023 at 9:38:34 AM      cc Report to: [id number] Artem Bauer DO      Final   No results found for this or any previous visit from the past 3650 days.     Cardiac Scoring: No results found for this or any previous visit from the past 1825 days.    AAA : No results found for this or any previous visit from the past 1825 days.    OTHER: No results found for this or any previous visit from the past 1825 days.    LAST IMAGING RESULTS  Transthoracic Echo (TTE) [place] [address]       Phone [phone] Fax [phone]    TRANSTHORACIC ECHOCARDIOGRAM REPORT       Patient Name:      CORNELIO Orona Physician:    [id number] Artem Bauer DO  Study Date:        10/26/2023           Ordering Provider:    [id number] KEYSHA GUERRA  MRN/PID:           [id number]             Fellow:  Accession#:        [id number]         Nurse:                Kelsey Dowell RN  Date of Birth/Age: 1950 / 73 years Sonographer:          Donya Jones RDCS  Gender:            M                    Additional Staff:  Height:             187.96 cm            Admit Date:  Weight:            179.62 kg            Admission Status:     Outpatient  BSA:               2.91 m2              Department Location:  [facility]                                                                Lab  Blood Pressure: 112 /72 mmHg    Study Type:    TRANSTHORACIC ECHO (TTE) COMPLETE  Diagnosis/ICD: Presence of prosthetic heart valve-Z95.2  Indication:    Dyspnea on Exertion  CPT Codes:     Echo Complete w Full [id number]    Patient History:  Pertinent History: CHF, CAD, AFIB, TAVR (9-12-23).    Study Detail: The following Echo studies were performed: 2D, M-Mode, Doppler and                color flow. Technically challenging study due to body habitus.                Optison used as a contrast agent for endocardial border                definition. Total contrast used for this procedure was 3 mL via IV                push.       PHYSICIAN INTERPRETATION:  Left Ventricle: Left ventricular systolic function is low normal, with an estimated ejection fraction of 50-55%. The left ventricular cavity size is normal. There is mild concentric left ventricular hypertrophy. Left ventricular diastolic filling was indeterminate.  Left Atrium: The left atrium is normal in size.  Right Ventricle: The right ventricle was not well visualized. Unable to determine right ventricular systolic function.  Right Atrium: The right atrium was not well visualized.  Aortic Valve: There is a prosthetic aortic valve present. There is transcatheter aortic valve replacement. There is no evidence of aortic valve regurgitation. The peak instantaneous gradient of the aortic valve is 14.5 mmHg. The mean gradient of the aortic valve is 8.2 mmHg.  Mitral Valve: The mitral valve was not well visualized. There is no evidence of mitral valve regurgitation.  Tricuspid Valve: The tricuspid valve was not well visualized. No evidence of tricuspid regurgitation.  Pulmonic Valve: The pulmonic valve is  not well visualized. There is trace to mild pulmonic valve regurgitation.  Pericardium: There is no pericardial effusion noted. There is a pericardial fat pad present.  Aorta: The aortic root is normal. There is mild dilatation of the ascending aorta.       CONCLUSIONS:   1. Left ventricular systolic function is low normal with a 50-55% estimated ejection fraction.   2. There is a transcatheter aortic valve replacement.    QUANTITATIVE DATA SUMMARY:  2D MEASUREMENTS:                            Normal Ranges:  LAs:           5.32 cm    (2.7-4.0cm)  IVSd:          1.34 cm    (0.6-1.1cm)  LVPWd:         1.25 cm    (0.6-1.1cm)  LVIDd:         5.40 cm    (3.9-5.9cm)  LV Mass Index: 101.5 g/m2    LA VOLUME:                                Normal Ranges:  LA Vol A4C:        79.6 ml    (22+/-6mL/m2)  LA Vol A2C:        84.2 ml  LA Vol Index A4C:  27.4 ml/m2  LA Vol Index A2C:  29.0 ml/m2  LA Area A4C:       23.7 cm2  LA Major Axis A4C: 6.0 cm  LA Vol A4C:        77.3 ml  LA Vol A2C:        80.5 ml    AORTA MEASUREMENTS:                     Normal Ranges:  Asc Ao, d: 4.00 cm (2.1-3.4cm)    LV SYSTOLIC FUNCTION BY 2D PLANIMETRY (MOD):                      Normal Ranges:  EF-A4C View: 46.2 % (>=55%)  EF-A2C View: 59.0 %  EF-Biplane:  52.3 %    LV DIASTOLIC FUNCTION:                         Normal Ranges:  MV Peak E:    1.27 m/s (0.7-1.2 m/s)  MV e'         0.12 m/s (>8.0)  MV lateral e' 0.13 m/s  MV medial e'  0.11 m/s  E/e' Ratio:   10.56    (<8.0)    MITRAL VALVE:                  Normal Ranges:  MV DT: 253 msec (150-240msec)    AORTIC VALVE:                                     Normal Ranges:  AoV Vmax:                1.90 m/s  (<=1.7m/s)  AoV Peak P.5 mmHg (<20mmHg)  AoV Mean P.2 mmHg  (1.7-11.5mmHg)  LVOT Max Herman:            0.94 m/s  (<=1.1m/s)  AoV VTI:                 44.02 cm  (18-25cm)  LVOT VTI:                19.10 cm  LVOT Diameter:           1.87 cm   (1.8-2.4cm)  AoV Area, VTI:            1.19 cm2  (2.5-5.5cm2)  AoV Area,Vmax:           1.35 cm2  (2.5-4.5cm2)  AoV Dimensionless Index: 0.43    AORTA:  Asc Ao Diam 4.03 cm       [id number] Artem Bauer DO  Electronically signed on 10/26/2023 at 3:19:19 PM       ** Final **    Problem List Items Addressed This Visit       (HFpEF) heart failure with preserved ejection fraction (CMS/HCC) - Primary    Ascending aorta dilatation (CMS/HCC)    Benign essential hypertension    CAD (coronary artery disease)    Diabetes mellitus, type 2 (CMS/HCC)    Hyperlipidemia    Nonrheumatic aortic valve stenosis    On rivaroxaban therapy    Morbid obesity (CMS/HCC)    SOBOE (shortness of breath on exertion)    Persistent atrial fibrillation with RVR (CMS/HCC)          Artem Bauer DO, FACC, FACOI

## 2024-04-05 ENCOUNTER — OFFICE VISIT (OUTPATIENT)
Dept: CARDIOLOGY | Facility: CLINIC | Age: 74
End: 2024-04-05
Payer: MEDICARE

## 2024-04-05 VITALS
WEIGHT: 315 LBS | BODY MASS INDEX: 40.43 KG/M2 | HEIGHT: 74 IN | DIASTOLIC BLOOD PRESSURE: 82 MMHG | HEART RATE: 56 BPM | SYSTOLIC BLOOD PRESSURE: 128 MMHG

## 2024-04-05 DIAGNOSIS — I50.32 CHRONIC HEART FAILURE WITH PRESERVED EJECTION FRACTION (MULTI): Primary | ICD-10-CM

## 2024-04-05 DIAGNOSIS — I10 BENIGN ESSENTIAL HYPERTENSION: ICD-10-CM

## 2024-04-05 DIAGNOSIS — I48.19 PERSISTENT ATRIAL FIBRILLATION WITH RVR (MULTI): ICD-10-CM

## 2024-04-05 DIAGNOSIS — E78.5 HYPERLIPIDEMIA, UNSPECIFIED HYPERLIPIDEMIA TYPE: ICD-10-CM

## 2024-04-05 DIAGNOSIS — E11.00 TYPE 2 DIABETES MELLITUS WITH HYPEROSMOLARITY WITHOUT COMA, WITHOUT LONG-TERM CURRENT USE OF INSULIN (MULTI): ICD-10-CM

## 2024-04-05 DIAGNOSIS — I25.10 CORONARY ARTERY DISEASE INVOLVING NATIVE CORONARY ARTERY OF NATIVE HEART WITHOUT ANGINA PECTORIS: ICD-10-CM

## 2024-04-05 DIAGNOSIS — I35.0 NONRHEUMATIC AORTIC VALVE STENOSIS: ICD-10-CM

## 2024-04-05 DIAGNOSIS — I25.10 CORONARY ARTERY DISEASE INVOLVING NATIVE CORONARY ARTERY OF NATIVE HEART, UNSPECIFIED WHETHER ANGINA PRESENT: ICD-10-CM

## 2024-04-05 DIAGNOSIS — Z79.01 ON RIVAROXABAN THERAPY: ICD-10-CM

## 2024-04-05 DIAGNOSIS — R06.02 SOBOE (SHORTNESS OF BREATH ON EXERTION): ICD-10-CM

## 2024-04-05 DIAGNOSIS — I77.810 ASCENDING AORTA DILATATION (CMS-HCC): ICD-10-CM

## 2024-04-05 DIAGNOSIS — E11.42 TYPE 2 DIABETES MELLITUS WITH DIABETIC POLYNEUROPATHY, WITHOUT LONG-TERM CURRENT USE OF INSULIN (MULTI): ICD-10-CM

## 2024-04-05 DIAGNOSIS — M79.604 RIGHT LEG PAIN: ICD-10-CM

## 2024-04-05 DIAGNOSIS — E78.00 PURE HYPERCHOLESTEROLEMIA: ICD-10-CM

## 2024-04-05 DIAGNOSIS — E11.65 TYPE 2 DIABETES MELLITUS WITH HYPERGLYCEMIA, UNSPECIFIED WHETHER LONG TERM INSULIN USE (MULTI): ICD-10-CM

## 2024-04-05 DIAGNOSIS — I48.19 PERSISTENT ATRIAL FIBRILLATION (MULTI): ICD-10-CM

## 2024-04-05 DIAGNOSIS — E66.01 MORBID OBESITY (MULTI): ICD-10-CM

## 2024-04-05 PROCEDURE — 4010F ACE/ARB THERAPY RXD/TAKEN: CPT | Performed by: INTERNAL MEDICINE

## 2024-04-05 PROCEDURE — 3074F SYST BP LT 130 MM HG: CPT | Performed by: INTERNAL MEDICINE

## 2024-04-05 PROCEDURE — 1159F MED LIST DOCD IN RCRD: CPT | Performed by: INTERNAL MEDICINE

## 2024-04-05 PROCEDURE — 1036F TOBACCO NON-USER: CPT | Performed by: INTERNAL MEDICINE

## 2024-04-05 PROCEDURE — 93000 ELECTROCARDIOGRAM COMPLETE: CPT | Performed by: INTERNAL MEDICINE

## 2024-04-05 PROCEDURE — 3008F BODY MASS INDEX DOCD: CPT | Performed by: INTERNAL MEDICINE

## 2024-04-05 PROCEDURE — 3044F HG A1C LEVEL LT 7.0%: CPT | Performed by: INTERNAL MEDICINE

## 2024-04-05 PROCEDURE — 3079F DIAST BP 80-89 MM HG: CPT | Performed by: INTERNAL MEDICINE

## 2024-04-05 PROCEDURE — 3048F LDL-C <100 MG/DL: CPT | Performed by: INTERNAL MEDICINE

## 2024-04-05 PROCEDURE — 99214 OFFICE O/P EST MOD 30 MIN: CPT | Performed by: INTERNAL MEDICINE

## 2024-04-05 RX ORDER — VALSARTAN AND HYDROCHLOROTHIAZIDE 160; 25 MG/1; MG/1
TABLET ORAL
COMMUNITY
Start: 2019-11-06

## 2024-04-05 RX ORDER — LOSARTAN POTASSIUM 25 MG/1
25 TABLET ORAL DAILY
Qty: 90 TABLET | Refills: 1 | Status: SHIPPED | OUTPATIENT
Start: 2024-04-05

## 2024-04-05 RX ORDER — CHOLECALCIFEROL (VITAMIN D3) 125 MCG
CAPSULE ORAL
COMMUNITY
Start: 2022-06-20

## 2024-04-05 RX ORDER — ATORVASTATIN CALCIUM 40 MG/1
40 TABLET, FILM COATED ORAL NIGHTLY
Qty: 90 TABLET | Refills: 1 | Status: SHIPPED | OUTPATIENT
Start: 2024-04-05

## 2024-04-05 RX ORDER — EMPAGLIFLOZIN 25 MG/1
25 TABLET, FILM COATED ORAL DAILY
Qty: 90 TABLET | Refills: 1 | Status: SHIPPED | OUTPATIENT
Start: 2024-04-05

## 2024-04-05 RX ORDER — FUROSEMIDE 40 MG/1
40 TABLET ORAL 2 TIMES DAILY
Qty: 180 TABLET | Refills: 1 | Status: SHIPPED | OUTPATIENT
Start: 2024-04-05

## 2024-04-05 RX ORDER — METOPROLOL SUCCINATE 100 MG/1
100 TABLET, EXTENDED RELEASE ORAL DAILY
Qty: 90 TABLET | Refills: 1 | Status: SHIPPED | OUTPATIENT
Start: 2024-04-05

## 2024-04-05 RX ORDER — SPIRONOLACTONE 25 MG/1
25 TABLET ORAL DAILY
Qty: 90 TABLET | Refills: 1 | Status: SHIPPED | OUTPATIENT
Start: 2024-04-05

## 2024-05-08 ENCOUNTER — APPOINTMENT (OUTPATIENT)
Dept: VASCULAR MEDICINE | Facility: HOSPITAL | Age: 74
End: 2024-05-08
Payer: MEDICARE

## 2024-05-29 ENCOUNTER — TELEPHONE (OUTPATIENT)
Dept: CARDIOLOGY | Facility: CLINIC | Age: 74
End: 2024-05-29
Payer: MEDICARE

## 2024-05-29 DIAGNOSIS — I50.32 CHRONIC HEART FAILURE WITH PRESERVED EJECTION FRACTION (MULTI): Primary | ICD-10-CM

## 2024-05-29 RX ORDER — POTASSIUM CHLORIDE 20 MEQ/1
20 TABLET, EXTENDED RELEASE ORAL DAILY
Qty: 90 TABLET | Refills: 1 | Status: SHIPPED | OUTPATIENT
Start: 2024-05-29 | End: 2024-11-25

## 2024-05-29 NOTE — TELEPHONE ENCOUNTER
- Called patient to notify him of lasix refill. Patient acknowledged his refill and stated he wanted another on because this current refill was only for 90 days. I advised him to call into the office 10 business days prior to his prescription running out to request a refill. Also advised patient that his Potassium was sent in today to pharmacy.  - Johanna Capone MA       ----- Message from Rowan Yañez RN sent at 5/29/2024 12:43 PM EDT -----  Call and tell him that he should have a refill for lasix.  It was sent in last month for 90 days with one refill.  We have sent KCL to provider to sign for refill.

## 2024-05-30 DIAGNOSIS — I48.19 PERSISTENT ATRIAL FIBRILLATION (MULTI): ICD-10-CM

## 2024-06-05 DIAGNOSIS — I48.19 PERSISTENT ATRIAL FIBRILLATION (MULTI): ICD-10-CM

## 2024-06-05 RX ORDER — RIVAROXABAN 20 MG/1
TABLET, FILM COATED ORAL
Qty: 90 TABLET | Refills: 0 | Status: SHIPPED | OUTPATIENT
Start: 2024-06-05

## 2024-06-06 NOTE — TELEPHONE ENCOUNTER
6/6/24  1134  After calling pharmacy and verifying that Xarelto did not transmit, called in a verbal Rx for 90 days with one refill.    Called and informed patient of such with patient verbalizing  understanding.      ----- Message from Marcela Marion sent at 6/6/2024  9:36 AM EDT -----  Regarding: med refill pharmacy telling patient they do not have  I am showing that a refill was sent yesterday to [facility] in [place].   Patient called in this morning stating that they never received from us.     Patient asked if we can call them.

## 2024-06-07 RX ORDER — RIVAROXABAN 20 MG/1
TABLET, FILM COATED ORAL
Qty: 90 TABLET | Refills: 0 | OUTPATIENT
Start: 2024-06-07

## 2024-07-02 ENCOUNTER — TELEPHONE (OUTPATIENT)
Dept: PRIMARY CARE | Facility: CLINIC | Age: 74
End: 2024-07-02
Payer: MEDICARE

## 2024-07-02 NOTE — TELEPHONE ENCOUNTER
Spoke to Fermin.  I gave him Dr. Purcell's instructions. He understood and will discuss at his next visit.

## 2024-07-02 NOTE — TELEPHONE ENCOUNTER
Patient called in and stated that his blood sugar is reading at 170-200. The patient feels that the Metformin is not working for him and he would like a different medication if possible.

## 2024-07-24 ENCOUNTER — LAB (OUTPATIENT)
Dept: LAB | Facility: LAB | Age: 74
End: 2024-07-24
Payer: MEDICARE

## 2024-07-24 DIAGNOSIS — N40.1 BPH WITH OBSTRUCTION/LOWER URINARY TRACT SYMPTOMS: ICD-10-CM

## 2024-07-24 DIAGNOSIS — E79.0 ASYMPTOMATIC HYPERURICEMIA: ICD-10-CM

## 2024-07-24 DIAGNOSIS — I50.32 CHRONIC HEART FAILURE WITH PRESERVED EJECTION FRACTION (MULTI): ICD-10-CM

## 2024-07-24 DIAGNOSIS — G47.33 OSA ON CPAP: ICD-10-CM

## 2024-07-24 DIAGNOSIS — N13.8 BPH WITH OBSTRUCTION/LOWER URINARY TRACT SYMPTOMS: ICD-10-CM

## 2024-07-24 DIAGNOSIS — E11.42 TYPE 2 DIABETES MELLITUS WITH DIABETIC POLYNEUROPATHY, WITHOUT LONG-TERM CURRENT USE OF INSULIN (MULTI): ICD-10-CM

## 2024-07-24 LAB
ALBUMIN SERPL BCP-MCNC: 3.8 G/DL (ref 3.4–5)
ALP SERPL-CCNC: 98 U/L (ref 33–136)
ALT SERPL W P-5'-P-CCNC: 20 U/L (ref 10–52)
ANION GAP SERPL CALC-SCNC: 16 MMOL/L (ref 10–20)
AST SERPL W P-5'-P-CCNC: 17 U/L (ref 9–39)
BASOPHILS # BLD AUTO: 0.07 X10*3/UL (ref 0–0.1)
BASOPHILS NFR BLD AUTO: 0.7 %
BILIRUB SERPL-MCNC: 0.8 MG/DL (ref 0–1.2)
BUN SERPL-MCNC: 32 MG/DL (ref 6–23)
CALCIUM SERPL-MCNC: 8.8 MG/DL (ref 8.6–10.3)
CHLORIDE SERPL-SCNC: 99 MMOL/L (ref 98–107)
CHOLEST SERPL-MCNC: 95 MG/DL (ref 0–199)
CHOLESTEROL/HDL RATIO: 2.6
CO2 SERPL-SCNC: 26 MMOL/L (ref 21–32)
CREAT SERPL-MCNC: 1.37 MG/DL (ref 0.5–1.3)
EGFRCR SERPLBLD CKD-EPI 2021: 55 ML/MIN/1.73M*2
EOSINOPHIL # BLD AUTO: 0.24 X10*3/UL (ref 0–0.4)
EOSINOPHIL NFR BLD AUTO: 2.3 %
ERYTHROCYTE [DISTWIDTH] IN BLOOD BY AUTOMATED COUNT: 16 % (ref 11.5–14.5)
GLUCOSE SERPL-MCNC: 152 MG/DL (ref 74–99)
HCT VFR BLD AUTO: 45.1 % (ref 41–52)
HDLC SERPL-MCNC: 36.1 MG/DL
HGB BLD-MCNC: 14.7 G/DL (ref 13.5–17.5)
IMM GRANULOCYTES # BLD AUTO: 0.04 X10*3/UL (ref 0–0.5)
IMM GRANULOCYTES NFR BLD AUTO: 0.4 % (ref 0–0.9)
LDLC SERPL CALC-MCNC: 31 MG/DL
LYMPHOCYTES # BLD AUTO: 1.54 X10*3/UL (ref 0.8–3)
LYMPHOCYTES NFR BLD AUTO: 14.6 %
MCH RBC QN AUTO: 28.9 PG (ref 26–34)
MCHC RBC AUTO-ENTMCNC: 32.6 G/DL (ref 32–36)
MCV RBC AUTO: 89 FL (ref 80–100)
MONOCYTES # BLD AUTO: 0.85 X10*3/UL (ref 0.05–0.8)
MONOCYTES NFR BLD AUTO: 8.1 %
NEUTROPHILS # BLD AUTO: 7.79 X10*3/UL (ref 1.6–5.5)
NEUTROPHILS NFR BLD AUTO: 73.9 %
NON HDL CHOLESTEROL: 59 MG/DL (ref 0–149)
NRBC BLD-RTO: 0 /100 WBCS (ref 0–0)
PLATELET # BLD AUTO: 156 X10*3/UL (ref 150–450)
POTASSIUM SERPL-SCNC: 4 MMOL/L (ref 3.5–5.3)
PROT SERPL-MCNC: 7.3 G/DL (ref 6.4–8.2)
PSA SERPL-MCNC: 2.64 NG/ML
RBC # BLD AUTO: 5.08 X10*6/UL (ref 4.5–5.9)
SODIUM SERPL-SCNC: 137 MMOL/L (ref 136–145)
TRIGL SERPL-MCNC: 140 MG/DL (ref 0–149)
URATE SERPL-MCNC: 7.1 MG/DL (ref 4–7.5)
VLDL: 28 MG/DL (ref 0–40)
WBC # BLD AUTO: 10.5 X10*3/UL (ref 4.4–11.3)

## 2024-07-24 PROCEDURE — 84153 ASSAY OF PSA TOTAL: CPT

## 2024-07-24 PROCEDURE — 84550 ASSAY OF BLOOD/URIC ACID: CPT

## 2024-07-24 PROCEDURE — 83036 HEMOGLOBIN GLYCOSYLATED A1C: CPT

## 2024-07-24 PROCEDURE — 36415 COLL VENOUS BLD VENIPUNCTURE: CPT

## 2024-07-24 PROCEDURE — 80061 LIPID PANEL: CPT

## 2024-07-24 PROCEDURE — 85025 COMPLETE CBC W/AUTO DIFF WBC: CPT

## 2024-07-24 PROCEDURE — 80053 COMPREHEN METABOLIC PANEL: CPT

## 2024-07-25 LAB
EST. AVERAGE GLUCOSE BLD GHB EST-MCNC: 163 MG/DL
HBA1C MFR BLD: 7.3 %

## 2024-08-01 ENCOUNTER — APPOINTMENT (OUTPATIENT)
Dept: PRIMARY CARE | Facility: CLINIC | Age: 74
End: 2024-08-01
Payer: MEDICARE

## 2024-08-01 VITALS
OXYGEN SATURATION: 92 % | TEMPERATURE: 97.6 F | DIASTOLIC BLOOD PRESSURE: 60 MMHG | BODY MASS INDEX: 51.33 KG/M2 | WEIGHT: 315 LBS | SYSTOLIC BLOOD PRESSURE: 100 MMHG | HEART RATE: 65 BPM

## 2024-08-01 DIAGNOSIS — E11.42 DIABETIC POLYNEUROPATHY ASSOCIATED WITH TYPE 2 DIABETES MELLITUS (MULTI): ICD-10-CM

## 2024-08-01 DIAGNOSIS — N40.1 BPH WITH OBSTRUCTION/LOWER URINARY TRACT SYMPTOMS: ICD-10-CM

## 2024-08-01 DIAGNOSIS — Z95.2 S/P TAVR (TRANSCATHETER AORTIC VALVE REPLACEMENT): ICD-10-CM

## 2024-08-01 DIAGNOSIS — I10 BENIGN ESSENTIAL HYPERTENSION: ICD-10-CM

## 2024-08-01 DIAGNOSIS — I48.19 PERSISTENT ATRIAL FIBRILLATION (MULTI): ICD-10-CM

## 2024-08-01 DIAGNOSIS — N13.8 BPH WITH OBSTRUCTION/LOWER URINARY TRACT SYMPTOMS: ICD-10-CM

## 2024-08-01 DIAGNOSIS — G47.33 OSA ON CPAP: ICD-10-CM

## 2024-08-01 DIAGNOSIS — E66.01 CLASS 3 SEVERE OBESITY DUE TO EXCESS CALORIES WITH SERIOUS COMORBIDITY AND BODY MASS INDEX (BMI) OF 50.0 TO 59.9 IN ADULT (MULTI): ICD-10-CM

## 2024-08-01 DIAGNOSIS — E11.42 TYPE 2 DIABETES MELLITUS WITH DIABETIC POLYNEUROPATHY, WITHOUT LONG-TERM CURRENT USE OF INSULIN (MULTI): Primary | ICD-10-CM

## 2024-08-01 PROCEDURE — 3048F LDL-C <100 MG/DL: CPT | Performed by: INTERNAL MEDICINE

## 2024-08-01 PROCEDURE — 3078F DIAST BP <80 MM HG: CPT | Performed by: INTERNAL MEDICINE

## 2024-08-01 PROCEDURE — 4010F ACE/ARB THERAPY RXD/TAKEN: CPT | Performed by: INTERNAL MEDICINE

## 2024-08-01 PROCEDURE — 1159F MED LIST DOCD IN RCRD: CPT | Performed by: INTERNAL MEDICINE

## 2024-08-01 PROCEDURE — 3051F HG A1C>EQUAL 7.0%<8.0%: CPT | Performed by: INTERNAL MEDICINE

## 2024-08-01 PROCEDURE — 3074F SYST BP LT 130 MM HG: CPT | Performed by: INTERNAL MEDICINE

## 2024-08-01 PROCEDURE — G2211 COMPLEX E/M VISIT ADD ON: HCPCS | Performed by: INTERNAL MEDICINE

## 2024-08-01 PROCEDURE — 99214 OFFICE O/P EST MOD 30 MIN: CPT | Performed by: INTERNAL MEDICINE

## 2024-08-01 RX ORDER — METFORMIN HYDROCHLORIDE 750 MG/1
750 TABLET, EXTENDED RELEASE ORAL DAILY
Qty: 90 TABLET | Refills: 1 | Status: SHIPPED | OUTPATIENT
Start: 2024-08-01 | End: 2025-01-28

## 2024-08-01 RX ORDER — INSULIN PUMP SYRINGE, 3 ML
1 EACH MISCELLANEOUS DAILY
Qty: 1 EACH | Refills: 0 | Status: SHIPPED | OUTPATIENT
Start: 2024-08-01

## 2024-08-01 RX ORDER — GABAPENTIN 300 MG/1
300 CAPSULE ORAL NIGHTLY
Qty: 90 CAPSULE | Refills: 1 | Status: SHIPPED | OUTPATIENT
Start: 2024-08-01 | End: 2025-01-28

## 2024-08-01 RX ORDER — BLOOD-GLUCOSE METER
EACH MISCELLANEOUS
Qty: 100 EACH | Refills: 3 | Status: SHIPPED | OUTPATIENT
Start: 2024-08-01

## 2024-08-01 ASSESSMENT — ENCOUNTER SYMPTOMS
MUSCULOSKELETAL NEGATIVE: 1
RESPIRATORY NEGATIVE: 1
CONSTITUTIONAL NEGATIVE: 1
GASTROINTESTINAL NEGATIVE: 1
NEUROLOGICAL NEGATIVE: 1
HEMATOLOGIC/LYMPHATIC NEGATIVE: 1
EYES NEGATIVE: 1
CARDIOVASCULAR NEGATIVE: 1
PSYCHIATRIC NEGATIVE: 1
ALLERGIC/IMMUNOLOGIC NEGATIVE: 1
ENDOCRINE NEGATIVE: 1

## 2024-08-01 NOTE — PROGRESS NOTES
Subjective   Patient ID: Fermin Coronado is a 74 y.o. male who presents for Follow-up.  Patient presents in follow up re:  Type 2 diabetes.  Patient has been compliant with medical therapy as prescribed and mostly compliant with diet and exercise recommendations.  HgbA1c has been maintained at goal level.  No hypoglycemia reported and no other associated complaints.          Review of Systems   Constitutional: Negative.    HENT: Negative.     Eyes: Negative.    Respiratory: Negative.     Cardiovascular: Negative.    Gastrointestinal: Negative.    Endocrine: Negative.    Genitourinary: Negative.    Musculoskeletal: Negative.    Skin: Negative.    Allergic/Immunologic: Negative.    Neurological: Negative.    Hematological: Negative.    Psychiatric/Behavioral: Negative.         Objective     Blood pressure 100/60, pulse 65, temperature 36.4 °C (97.6 °F), temperature source Temporal, weight (!) 181 kg (399 lb 12.8 oz), SpO2 92%.   Physical Exam  Vitals reviewed.   Constitutional:       Appearance: Normal appearance. He is obese.   Neck:      Vascular: No carotid bruit.   Cardiovascular:      Rate and Rhythm: Normal rate and regular rhythm.      Pulses: Normal pulses.      Heart sounds: Normal heart sounds. No murmur heard.  Pulmonary:      Effort: Pulmonary effort is normal.      Breath sounds: Normal breath sounds. No wheezing or rales.   Abdominal:      General: Abdomen is flat. There is no distension.      Palpations: Abdomen is soft.      Tenderness: There is no abdominal tenderness.   Musculoskeletal:         General: Normal range of motion.      Cervical back: Normal range of motion and neck supple.   Skin:     General: Skin is warm and dry.   Neurological:      General: No focal deficit present.      Mental Status: He is alert and oriented to person, place, and time.   Psychiatric:         Mood and Affect: Mood normal.         Behavior: Behavior normal.         Assessment/Plan   Problem List Items Addressed This  Visit       Benign essential hypertension    Relevant Orders    CBC and Auto Differential    BPH with obstruction/lower urinary tract symptoms    Diabetes mellitus, type 2 (Multi) - Primary    Relevant Medications    blood sugar diagnostic (OneTouch Verio test strips) strip    metFORMIN XR (Glucophage-XR) 750 mg 24 hr tablet    Blood glucose monitoring meter kit kit    Other Relevant Orders    Comprehensive Metabolic Panel    Hemoglobin A1C    Lipid Panel    Diabetic neuropathy (Multi)    Relevant Medications    gabapentin (Neurontin) 300 mg capsule    TAMARA on CPAP    Persistent atrial fibrillation (Multi)    S/P TAVR (transcatheter aortic valve replacement)     Other Visit Diagnoses       Class 3 severe obesity due to excess calories with serious comorbidity and body mass index (BMI) of 50.0 to 59.9 in adult (Multi)              Sugars well controlled overall with A1c at goal though A1c has been rising over the past year.  Will continue present medical therapy and follow up.  Pt. advised on improving dietary choices and portion control as well as increasing exercise to promote weight loss.   Neuropathy overall well compensated on current therapy.  Will continue present therapy and follow.  He continues follow up with Cardiology for management of cardiac issues.  Pt. continues to use BiPAP nightly and is tolerating well.  Advised to continue treatment and will continue to follow clinically.       Follow up in 6 months  Lab to be drawn 1 week prior to next office visit.

## 2024-08-05 ENCOUNTER — APPOINTMENT (OUTPATIENT)
Dept: UROLOGY | Facility: CLINIC | Age: 74
End: 2024-08-05
Payer: MEDICARE

## 2024-08-05 VITALS — WEIGHT: 315 LBS | HEIGHT: 73 IN | BODY MASS INDEX: 41.75 KG/M2

## 2024-08-05 DIAGNOSIS — N40.1 BPH WITH OBSTRUCTION/LOWER URINARY TRACT SYMPTOMS: ICD-10-CM

## 2024-08-05 DIAGNOSIS — N13.8 BPH WITH OBSTRUCTION/LOWER URINARY TRACT SYMPTOMS: ICD-10-CM

## 2024-08-05 DIAGNOSIS — Z12.5 SCREENING PSA (PROSTATE SPECIFIC ANTIGEN): Primary | ICD-10-CM

## 2024-08-05 LAB
POC BILIRUBIN, URINE: NEGATIVE
POC BLOOD, URINE: NEGATIVE
POC GLUCOSE, URINE: ABNORMAL MG/DL
POC KETONES, URINE: NEGATIVE MG/DL
POC LEUKOCYTES, URINE: NEGATIVE
POC NITRITE,URINE: NEGATIVE
POC PH, URINE: 6 PH
POC PROTEIN, URINE: NEGATIVE MG/DL
POC SPECIFIC GRAVITY, URINE: 1.01
POC UROBILINOGEN, URINE: 1 EU/DL

## 2024-08-05 PROCEDURE — 81003 URINALYSIS AUTO W/O SCOPE: CPT | Performed by: UROLOGY

## 2024-08-05 PROCEDURE — 3048F LDL-C <100 MG/DL: CPT | Performed by: UROLOGY

## 2024-08-05 PROCEDURE — 99213 OFFICE O/P EST LOW 20 MIN: CPT | Performed by: UROLOGY

## 2024-08-05 PROCEDURE — 3051F HG A1C>EQUAL 7.0%<8.0%: CPT | Performed by: UROLOGY

## 2024-08-05 PROCEDURE — 1036F TOBACCO NON-USER: CPT | Performed by: UROLOGY

## 2024-08-05 PROCEDURE — 1159F MED LIST DOCD IN RCRD: CPT | Performed by: UROLOGY

## 2024-08-05 PROCEDURE — 3008F BODY MASS INDEX DOCD: CPT | Performed by: UROLOGY

## 2024-08-05 PROCEDURE — 4010F ACE/ARB THERAPY RXD/TAKEN: CPT | Performed by: UROLOGY

## 2024-08-05 NOTE — PROGRESS NOTES
"Chief Complaint   Patient presents with    Benign Prostatic Hypertrophy     Patient is here today for yearly follow up   08/05/2024  Voiding well    Patient has no nausea, no vomiting, no fever.    ERIKA: 1+ no nodule    PSA: Normal 2.64    We discussed benign prostate hypertrophy with mild voiding symptom  We discussed PSA screening, PSA normal updated  All the questions were answered, the patient expressed understanding and agreed to the plan.    Impression  BPH  PSA screening    Plan  Watch voiding  Yearly ERIKA and PSA     Physical Exam     TODAYS LAB RESULTS:    Lab Results   Component Value Date    PSA 2.64 07/24/2024    PSA 2.70 07/24/2023    PSA 2.63 07/28/2022      POC Glucose, Urine  NEGATIVE mg/dl >=1000 (4+) Abnormal    POC Bilirubin, Urine  NEGATIVE NEGATIVE   POC Ketones, Urine  NEGATIVE mg/dl NEGATIVE   POC Specific Gravity, Urine  1.005 - 1.035 1.010   POC Blood, Urine  NEGATIVE NEGATIVE   POC PH, Urine  No Reference Range Established PH 6.0   POC Protein, Urine  NEGATIVE, 30 (1+) mg/dl NEGATIVE   POC Urobilinogen, Urine  0.2, 1.0 EU/DL 1.0   Poc Nitrite, Urine  NEGATIVE NEGATIVE   POC Leukocytes, Urine  NEGATIVE NEGATIVE       ASSESSMENT&PLAN:      IMPRESSIONS:    7/18/23 Dr. Mcclure  Established  1.BPH with LUTS  On doxazosin 8 mg  IPSS = 14  Incomplete emptying most bothersome   PVR 25ml     2. Hematuria  NO interval gross hematuria   having cardiac surgery in August      Last visit 2022 MD:  71 year old male patient here today for \"year check\" for BPH      1) BPH with LUTS   -Cystoscopy (5/18/22) Prostatic urethra 4 cm, completely obstructing lateral lobes 1 cm of intraprostatic protrusion  -Complaining frequency, urgency, post-void dribbling   -On Doxazosin 8 mg daily   -Bladder scan 20ml   -Patient has a follow up in August with Dr. Hathaway and he states danielle the would like to keep that appointment with him.      2) Gross hematuria   -Amado catheter placed during diuresis.recently, developed pink " "urine in Amado catheter.   -Cystoscopy (5/18/22) No papillary bladder tumors, mild catheter cystitis posterior wall  -Smoked 1 pack a day for 3 years, worked in rubber [employer]   -Was unable to leave UA today, patient informed come to appointment with full bladder next visit    -Will need upper tract imaging if hematuria continues   1   3) PSA screening   -PSA trend    7-6-21 3.14   1/11/21 2.84  .10/30/19 2.46   10/23/18 3.47   -Gave order for PSA        All the questions were answered, the patient expressed understanding and agreed to the plan.     5/18/22  Here today for cystoscopy     Hospital consult 5/9/22:  \"CORNELIO URBINA is a 71 year old Male who was admitted with CHF exacerbation. Amado catheter placed during diuresis.recently, developed pink urine in Amado catheter. Denies any trauma during Amado catheter placement. Denies any previous history of gross hematuria. Known to urology with a history of BPH, on doxazosin at home, follows with Dr. Blackwell or Kaity Truong CNP in our office. 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 Mainly worked as a . No  service. Smoked 1 pack/day for 3 years. Location is the bladder, duration 2 days, aggravated with Amado catheter placement, no remitting factors.\"     7/12/2022 Kaity alvarez    71 year old male patient here today for \"year check\" for BPH      1) BPH with LUTS   -Cystoscopy (5/18/22) Prostatic urethra 4 cm, completely obstructing lateral lobes 1 cm of intraprostatic protrusion  -Complaining frequency, urgency, post-void dribbling   -On Doxazosin 8 mg daily   -Bladder scan 20ml   -Patient has a follow up in August with Dr. Hathaway and he states danielle the would like to keep that appointment with him.      2) Gross hematuria   -Amado catheter placed during diuresis.recently, developed pink urine in Amado catheter.   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

## 2024-08-06 ENCOUNTER — TELEPHONE (OUTPATIENT)
Dept: UROLOGY | Facility: CLINIC | Age: 74
End: 2024-08-06
Payer: MEDICARE

## 2024-08-06 DIAGNOSIS — N40.1 BPH WITH OBSTRUCTION/LOWER URINARY TRACT SYMPTOMS: Primary | ICD-10-CM

## 2024-08-06 DIAGNOSIS — N13.8 BPH WITH OBSTRUCTION/LOWER URINARY TRACT SYMPTOMS: Primary | ICD-10-CM

## 2024-08-06 RX ORDER — DOXAZOSIN 8 MG/1
8 TABLET ORAL DAILY
Qty: 90 TABLET | Refills: 3 | Status: SHIPPED | OUTPATIENT
Start: 2024-08-06 | End: 2024-08-07 | Stop reason: SDUPTHER

## 2024-08-07 ENCOUNTER — TELEPHONE (OUTPATIENT)
Dept: UROLOGY | Facility: CLINIC | Age: 74
End: 2024-08-07
Payer: MEDICARE

## 2024-08-07 DIAGNOSIS — N13.8 BPH WITH OBSTRUCTION/LOWER URINARY TRACT SYMPTOMS: ICD-10-CM

## 2024-08-07 DIAGNOSIS — N40.1 BPH WITH OBSTRUCTION/LOWER URINARY TRACT SYMPTOMS: ICD-10-CM

## 2024-08-07 RX ORDER — DOXAZOSIN 8 MG/1
8 TABLET ORAL DAILY
Qty: 90 TABLET | Refills: 3 | Status: SHIPPED | OUTPATIENT
Start: 2024-08-07

## 2024-09-04 DIAGNOSIS — I48.19 PERSISTENT ATRIAL FIBRILLATION (MULTI): ICD-10-CM

## 2024-09-06 RX ORDER — RIVAROXABAN 20 MG/1
20 TABLET, FILM COATED ORAL DAILY
Qty: 90 TABLET | Refills: 0 | OUTPATIENT
Start: 2024-09-06

## 2024-09-23 ENCOUNTER — TELEPHONE (OUTPATIENT)
Dept: PRIMARY CARE | Facility: CLINIC | Age: 74
End: 2024-09-23
Payer: MEDICARE

## 2024-09-23 DIAGNOSIS — E79.0 ASYMPTOMATIC HYPERURICEMIA: ICD-10-CM

## 2024-09-23 RX ORDER — ALLOPURINOL 100 MG/1
100 TABLET ORAL DAILY
Qty: 90 TABLET | Refills: 1 | Status: SHIPPED | OUTPATIENT
Start: 2024-09-23 | End: 2025-03-22

## 2024-09-23 NOTE — TELEPHONE ENCOUNTER
Rx Refill Request Telephone Encounter    Name:  Fermin GARVEY Cliff  :  [id number]  Medication Name:  \    allopurinol (Zyloprim) 100 mg tablet [[id number]]  1 tab taken once a day                    Specific Pharmacy location:  [facility] in [place]  Date of last appointment:  2024  Date of next appointment:  2025  Best number to reach patient:  [phone]

## 2024-09-27 DIAGNOSIS — I35.0 NONRHEUMATIC AORTIC VALVE STENOSIS: ICD-10-CM

## 2024-09-27 DIAGNOSIS — M79.604 RIGHT LEG PAIN: ICD-10-CM

## 2024-09-27 DIAGNOSIS — I10 BENIGN ESSENTIAL HYPERTENSION: ICD-10-CM

## 2024-09-27 DIAGNOSIS — Z79.01 ON RIVAROXABAN THERAPY: ICD-10-CM

## 2024-09-27 DIAGNOSIS — I48.19 PERSISTENT ATRIAL FIBRILLATION (MULTI): ICD-10-CM

## 2024-09-27 DIAGNOSIS — I50.32 CHRONIC HEART FAILURE WITH PRESERVED EJECTION FRACTION: ICD-10-CM

## 2024-09-27 DIAGNOSIS — E78.00 PURE HYPERCHOLESTEROLEMIA: ICD-10-CM

## 2024-09-27 DIAGNOSIS — E78.5 HYPERLIPIDEMIA, UNSPECIFIED HYPERLIPIDEMIA TYPE: ICD-10-CM

## 2024-09-27 DIAGNOSIS — I25.10 CORONARY ARTERY DISEASE INVOLVING NATIVE CORONARY ARTERY OF NATIVE HEART WITHOUT ANGINA PECTORIS: ICD-10-CM

## 2024-09-27 DIAGNOSIS — E11.00 TYPE 2 DIABETES MELLITUS WITH HYPEROSMOLARITY WITHOUT COMA, WITHOUT LONG-TERM CURRENT USE OF INSULIN (MULTI): ICD-10-CM

## 2024-09-27 DIAGNOSIS — E11.42 TYPE 2 DIABETES MELLITUS WITH DIABETIC POLYNEUROPATHY, WITHOUT LONG-TERM CURRENT USE OF INSULIN: ICD-10-CM

## 2024-09-27 DIAGNOSIS — I25.10 CORONARY ARTERY DISEASE INVOLVING NATIVE CORONARY ARTERY OF NATIVE HEART, UNSPECIFIED WHETHER ANGINA PRESENT: ICD-10-CM

## 2024-09-27 DIAGNOSIS — R06.02 SOBOE (SHORTNESS OF BREATH ON EXERTION): ICD-10-CM

## 2024-09-27 DIAGNOSIS — I48.19 PERSISTENT ATRIAL FIBRILLATION WITH RVR (MULTI): ICD-10-CM

## 2024-09-27 DIAGNOSIS — I77.810 ASCENDING AORTA DILATATION (CMS-HCC): ICD-10-CM

## 2024-09-27 DIAGNOSIS — E66.01 MORBID OBESITY (MULTI): ICD-10-CM

## 2024-09-30 DIAGNOSIS — I25.10 CORONARY ARTERY DISEASE INVOLVING NATIVE CORONARY ARTERY OF NATIVE HEART WITHOUT ANGINA PECTORIS: ICD-10-CM

## 2024-09-30 DIAGNOSIS — I35.0 NONRHEUMATIC AORTIC VALVE STENOSIS: ICD-10-CM

## 2024-09-30 DIAGNOSIS — I48.19 PERSISTENT ATRIAL FIBRILLATION WITH RVR (MULTI): ICD-10-CM

## 2024-09-30 DIAGNOSIS — E11.00 TYPE 2 DIABETES MELLITUS WITH HYPEROSMOLARITY WITHOUT COMA, WITHOUT LONG-TERM CURRENT USE OF INSULIN (MULTI): ICD-10-CM

## 2024-09-30 DIAGNOSIS — I25.10 CORONARY ARTERY DISEASE INVOLVING NATIVE CORONARY ARTERY OF NATIVE HEART, UNSPECIFIED WHETHER ANGINA PRESENT: ICD-10-CM

## 2024-09-30 DIAGNOSIS — I10 BENIGN ESSENTIAL HYPERTENSION: ICD-10-CM

## 2024-09-30 DIAGNOSIS — R06.02 SOBOE (SHORTNESS OF BREATH ON EXERTION): ICD-10-CM

## 2024-09-30 DIAGNOSIS — E78.5 HYPERLIPIDEMIA, UNSPECIFIED HYPERLIPIDEMIA TYPE: ICD-10-CM

## 2024-09-30 DIAGNOSIS — E66.01 MORBID OBESITY (MULTI): ICD-10-CM

## 2024-09-30 DIAGNOSIS — E11.42 TYPE 2 DIABETES MELLITUS WITH DIABETIC POLYNEUROPATHY, WITHOUT LONG-TERM CURRENT USE OF INSULIN: ICD-10-CM

## 2024-09-30 DIAGNOSIS — I77.810 ASCENDING AORTA DILATATION (CMS-HCC): ICD-10-CM

## 2024-09-30 DIAGNOSIS — M79.604 RIGHT LEG PAIN: ICD-10-CM

## 2024-09-30 DIAGNOSIS — I50.32 CHRONIC HEART FAILURE WITH PRESERVED EJECTION FRACTION: ICD-10-CM

## 2024-09-30 DIAGNOSIS — Z79.01 ON RIVAROXABAN THERAPY: ICD-10-CM

## 2024-09-30 DIAGNOSIS — I48.19 PERSISTENT ATRIAL FIBRILLATION (MULTI): ICD-10-CM

## 2024-09-30 DIAGNOSIS — E78.00 PURE HYPERCHOLESTEROLEMIA: ICD-10-CM

## 2024-09-30 RX ORDER — EMPAGLIFLOZIN 25 MG/1
25 TABLET, FILM COATED ORAL DAILY
Qty: 90 TABLET | Refills: 1 | Status: SHIPPED | OUTPATIENT
Start: 2024-09-30

## 2024-09-30 RX ORDER — POTASSIUM CHLORIDE 20 MEQ/1
20 TABLET, EXTENDED RELEASE ORAL DAILY
Qty: 90 TABLET | Refills: 1 | Status: SHIPPED | OUTPATIENT
Start: 2024-09-30 | End: 2025-03-29

## 2024-09-30 RX ORDER — SPIRONOLACTONE 25 MG/1
25 TABLET ORAL DAILY
Qty: 90 TABLET | Refills: 1 | Status: SHIPPED | OUTPATIENT
Start: 2024-09-30

## 2024-09-30 RX ORDER — METOPROLOL SUCCINATE 100 MG/1
100 TABLET, EXTENDED RELEASE ORAL DAILY
Qty: 90 TABLET | Refills: 1 | Status: SHIPPED | OUTPATIENT
Start: 2024-09-30

## 2024-09-30 RX ORDER — FUROSEMIDE 40 MG/1
40 TABLET ORAL 2 TIMES DAILY
Qty: 180 TABLET | Refills: 1 | Status: SHIPPED | OUTPATIENT
Start: 2024-09-30

## 2024-09-30 RX ORDER — ATORVASTATIN CALCIUM 40 MG/1
40 TABLET, FILM COATED ORAL NIGHTLY
Qty: 90 TABLET | Refills: 0 | OUTPATIENT
Start: 2024-09-30

## 2024-09-30 RX ORDER — LOSARTAN POTASSIUM 25 MG/1
25 TABLET ORAL DAILY
Qty: 90 TABLET | Refills: 1 | Status: SHIPPED | OUTPATIENT
Start: 2024-09-30

## 2024-09-30 RX ORDER — ATORVASTATIN CALCIUM 40 MG/1
40 TABLET, FILM COATED ORAL NIGHTLY
Qty: 90 TABLET | Refills: 3 | Status: SHIPPED | OUTPATIENT
Start: 2024-09-30

## 2024-10-14 NOTE — PROGRESS NOTES
[facility] Heart and Vascular Cardiology    Patient Name: Fermin Coronado  Patient : 1950      Scribe Attestation  By signing my name below, IMaria Dolores Scribe   attest that this documentation has been prepared under the direction and in the presence of Artem Bauer DO.      Reason for visit:  This is a 74-year-old male here for follow-up regarding HFpEF/shortness of breath, persistent atrial fibrillation, anticoagulation with rivaroxaban, aortic valve stenosis status post TAVR, dilated ascending aorta, mild diffuse coronary artery disease as seen on cardiac catheterization done in 2023, hypertension, dyslipidemia, diabetes mellitus, and morbid obesity.    HPI:  This is a 74-year-old male here for follow-up regarding HFpEF/shortness of breath, persistent atrial fibrillation, anticoagulation with rivaroxaban, aortic valve stenosis status post TAVR, dilated ascending aorta, mild diffuse coronary artery disease as seen on cardiac catheterization done in 2023, hypertension, dyslipidemia, diabetes mellitus, and morbid obesity.  The patient was last evaluated by me in 2024.  At that visit I ordered a PVR as the patient was reporting right leg pain/coldness, ordered an echocardiogram to be completed in 6 months, ordered blood work including BMP/CBC/magnesium to be drawn in 6 months, and asked the patient to follow-up in 6 months and sooner if necessary.  CMP done in 2024 showed normal serum sodium and potassium with a serum creatinine of 1.37 consistent with known CKD, normal ALT/AST, hemoglobin A1c was 7.3%, CBC showed a hemoglobin of 14.7.  Lipid panel done in 2024 showed an LDL cholesterol of 31 and triglycerides of 140 while on atorvastatin 40 mg daily.  PVR was not completed. ECG done today showed atrial fibrillation with a heart rate of 58 bpm.  The patient reports that he has been feeling generally well from the cardiac standpoint. He denies any new chest  pain, shortness of breath, palpitations and lightheadedness. He states that the previously reported cold sensation on his lower extremities had improved. He states that he takes all of his medications as prescribed. During my exam, he was resting comfortably on the exam table.            Assessment/Plan:   1. HFpEF/shortness of breath  The patient has a history of HFpEF.  Echocardiogram done in October 2023 showed low normal left ventricular systolic function with an ejection fraction of 50 to 55%, poorly visualized right ventricle, TAVR with a mean aortic valve gradient of 8.2 mmHg and a dimensionless index of 0.43.   The previously reported shortness of breath which appears to be multifactorial had improved since the last visit.   He does not appear significantly volume overloaded on exam today except for trace pitting bilateral lower extremity edema.  He should continue current cardiac medications.  Recent lab works as noted in the HPI.  Lab works as noted below will be done in 6 months prior to his next visit.   Echocardiogram was ordered as noted below.   I discussed with him the importance of following a low-sodium heart healthy diet as well as weight loss, wearing compression stockings and elevating legs when seated.   Follow up in 6 months and sooner if necessary.      2. Persistent atrial fibrillation  The patient has a history of persistent atrial fibrillation, on rivaroxaban for thromboembolism prophylaxis, which should be continued.  He should continue metoprolol succinate for heart rate control.  ECG done today showed atrial fibrillation with a heart rate of 58 bpm.    He denies palpitations or lightheadedness.   Echocardiogram done in October 2023 showed low normal left ventricular systolic function with an ejection fraction of 50 to 55%, poorly visualized right ventricle, TAVR with a mean aortic valve gradient of 8.2 mmHg and a dimensionless index of 0.43.   Recent lab works as noted in the HPI.    Echocardiogram was ordered as noted below.   Lab works as noted below will be done in 6 months prior to his next visit.   Follow up in 6 months and sooner if necessary.      3. Anticoagulation with rivaroxaban  The patient is on anticoagulation with rivaroxaban for atrial fibrillation.  Recent lab works as noted in the HPI.  Lab works as noted below will be done in 6 months prior to his next visit.      4. Aortic valve stenosis  The patient has a history of aortic valve stenosis status post TAVR on 9/12/2023 with an evolute fracture 34 mm valve.   Echocardiogram done in October 2023 showed low normal left ventricular systolic function with an ejection fraction of 50 to 55%, poorly visualized right ventricle, TAVR with a mean aortic valve gradient of 8.2 mmHg and a dimensionless index of 0.43.   Echocardiogram was ordered as noted below.   He should continue to follow with the [facility].      5. Dilated ascending aorta  The patient was noted to have a moderately dilated ascending aorta measured at 4.4 cm on prior echocardiogram.  Echocardiogram as noted above.  Continue risk factor modification.     6. Coronary artery disease  The patient has a history of mild diffuse coronary artery disease as seen on cardiac catheterization done in August 2023.  ECG done today showed atrial fibrillation with a heart rate of 58 bpm.    He denies anginal chest discomfort.  Blood pressure appears controlled on exam today.  He should continue current antihypertensive medications and antiplatelet therapy.  Echocardiogram done in October 2023 showed low normal left ventricular systolic function with an ejection fraction of 50 to 55%, poorly visualized right ventricle, TAVR with a mean aortic valve gradient of 8.2 mmHg and a dimensionless index of 0.43.   Recent lab works as noted in the HPI.  Lipid panel done in July 2024 showed an LDL cholesterol of 31 and triglycerides of 140 while on atorvastatin 40 mg daily.     Please see lifestyle recommendations below.  Follow up in 6 months and sooner if necessary.      7. Hypertension  The patient has a history of hypertension which appears controlled on exam today.  He should continue his current antihypertensive medications and monitor his blood pressure at home.      8. Dyslipidemia  Lipid panel done in July 2024 showed an LDL cholesterol of 31 and triglycerides of 140 while on atorvastatin 40 mg daily.   Please see lifestyle recommendations below.      9. Diabetes Mellitus  Hemoglobin A1c done in July 2024 was 7.3%.  Medication management as per PCP.     10. CKD  Serum creatinine done in July 2024 was 1.37 consistent with known CKD.  Management as per PCP.     11. Morbid obesity  Please see lifestyle recommendations below.       Orders:   Echocardiogram   BMP/CBC/magnesium in 6 months,   Follow-up in 6 months.    Lifestyle Recommendations  I recommend a whole-food plant-based diet, an eating pattern that encourages the consumption of unrefined plant foods (such as fruits, vegetables, tubers, whole grains, legumes, nuts and seeds) and discourages meats, dairy products, eggs and processed foods.     The AHA/ACC recommends that the patient consume a dietary pattern that emphasizes intake of vegetables, fruits, and whole grains; includes low-fat dairy products, poultry, fish, legumes, non-tropical vegetable oils, and nuts; and limits intake of sodium, sweets, sugar-sweetened beverages, and red meats.  Adapt this dietary pattern to appropriate calorie requirements (a 500-750 kcal/day deficit to loose weight), personal and cultural food preferences, and nutrition therapy for other medical conditions (including diabetes).  Achieve this pattern by following plans such as the [place] Mediterranean, DASH dietary pattern, or AHA diet.     Engage in 2 hours and 30 minutes per week of moderate-intensity physical activity, or 1 hour and 15 minutes (75 minutes) per week of vigorous-intensity  aerobic physical activity, or an equivalent combination of moderate and vigorous-intensity aerobic physical activity. Aerobic activity should be performed in episodes of at least 10 minutes preferably spread throughout the week.     Adhering to a heart healthy diet, regular exercise habits, avoidance of tobacco products, and maintenance of a healthy weight are crucial components of their heart disease risk reduction.     Any positive review of systems not specifically addressed in the office visit today should be evaluated and treated by the patients primary care physician or in an emergency department if necessary     Patient was notified that results from ordered tests will be called to the patient if it changes current management; it will otherwise be discussed at a future appointment and available on  Melty.     Thank you for allowing me to participate in the care of this patient.        This document was generated using the assistance of voice recognition software. If there are any errors of spelling, grammar, syntax, or meaning; please feel free to contact me directly for clarification.    Past Medical History:  He has a past medical history of Benign prostatic hyperplasia without lower urinary tract symptoms, Personal history of other endocrine, nutritional and metabolic disease, and Unspecified fracture of shaft of humerus, right arm, initial encounter for closed fracture.    Past Surgical History:  He has a past surgical history that includes Tonsillectomy (09/13/2016); Cholecystectomy (09/13/2016); and Cardiac surgery.      Social History:  He reports that he quit smoking about 45 years ago. His smoking use included cigarettes. He started smoking about 49 years ago. He has a 2 pack-year smoking history. He quit smokeless tobacco use about 44 years ago.  His smokeless tobacco use included chew. He reports that he does not currently use alcohol. He reports that he does not use drugs.    Family  History:  Family History   Problem Relation Name Age of Onset    Hypertension Mother      Coronary artery disease Mother      Breast cancer Mother      Hypertension Father      Hypertension Sister      Diabetes Sister      Hyperlipidemia Sister          Allergies:  Patient has no known allergies.    Outpatient Medications:  Current Outpatient Medications   Medication Instructions    allopurinol (ZYLOPRIM) 100 mg, oral, Daily    aspirin 81 mg chewable tablet 1 tablet, oral, Daily    atorvastatin (LIPITOR) 40 mg, oral, Nightly    Blood glucose monitoring meter kit kit 1 each, miscellaneous, Daily    blood sugar diagnostic (ONE TOUCH TEST MISC) 1 each, in vitro, Daily, Machine Use brand preferred by patient and insurance coverage     blood sugar diagnostic (OneTouch Verio test strips) strip USE 1 STRIP DAILY.  what ever brand covered by insurance as they prefer    cholecalciferol (Vitamin D-3) 25 MCG (1000 UT) tablet 1 tablet, oral, Daily    doxazosin (CARDURA) 8 mg, oral, Daily    fish oil concentrate (Omega-3) 120-180 mg capsule 1 capsule, oral, Daily    furosemide (LASIX) 40 mg, oral, 2 times daily    gabapentin (NEURONTIN) 300 mg, oral, Nightly    Jardiance 25 mg, oral, Daily    losartan (COZAAR) 25 mg, oral, Daily    metFORMIN XR (GLUCOPHAGE-XR) 750 mg, oral, Daily    metoprolol succinate XL (TOPROL-XL) 100 mg, oral, Daily    multivitamin with folic acid (One Daily Multivitamin) 400 mcg tablet 1 tablet, oral, Daily    mupirocin (Bactroban) 2 % ointment APPLY TO LEGS WITH TRIAMCINOLONE DAILY    naproxen sodium 220 mg capsule oral    nettle-pumpkin-saw palm-min 17 (Prostate Therapy) capsule oral, Daily    potassium chloride CR 20 mEq ER tablet 20 mEq, oral, Daily    rivaroxaban (XARELTO) 20 mg, oral, Daily with evening meal, Take with food.    spironolactone (ALDACTONE) 25 mg, oral, Daily    triamcinolone (Kenalog) 0.1 % cream APPLY DAILY TO LEGS.    valsartan-hydrochlorothiazide (Diovan-HCT) 160-25 mg tablet oral,  "Daily RT        ROS:  A 14 point review of systems was done and is negative other than as stated in HPI    Vitals:      8/15/2023     9:51 AM 9/26/2023     1:16 PM 10/13/2023    12:35 PM 1/25/2024     1:16 PM 4/5/2024     1:12 PM 8/1/2024     1:34 PM 8/5/2024     1:16 PM   Vitals   Systolic 120 101 112 131 128 100    Diastolic 82 66 72 83 82 60    Heart Rate 66 56 62 66 56 65    Temp    35.7 °C (96.3 °F)  36.4 °C (97.6 °F)    Resp  20  16      Height (in) 1.829 m (6')  1.88 m (6' 2\") 1.88 m (6' 2\") 1.88 m (6' 2\")  1.854 m (6' 1\")   Weight (lb) 390 395 396 397.4 396 399.8 395   BMI 52.89 kg/m2 53.57 kg/m2 50.84 kg/m2 51.02 kg/m2 50.84 kg/m2 51.33 kg/m2 52.11 kg/m2   BSA (m2) 3 m2 3.02 m2 3.07 m2 3.07 m2 3.07 m2 3.07 m2 3.04 m2        Physical Exam:   Constitutional: Cooperative, in no acute distress, alert, appears stated age.  Skin: Skin color, texture, turgor normal. No rashes or lesions.  Head: Normocephalic. No masses, lesions, tenderness or abnormalities  Eyes: Extraocular movements are grossly intact.  Mouth and throat: Mucous membranes moist  Neck: Neck supple, no carotid bruits, no JVD  Respiratory: Lungs clear to auscultation, no wheezing or rhonchi, no use of accessory muscles  Chest wall: No scars, normal excursion with respiration  Cardiovascular: Irregular rhythm without murmur  Gastrointestinal: Abdomen soft, nontender. Bowel sounds normal.  Musculoskeletal: Strength equal in upper extremities  Extremities: Trace pitting edema  Neurologic: Sensation grossly intact, alert and oriented ×3      Intake/Output:   No intake/output data recorded.    Outpatient Medications  Current Outpatient Medications on File Prior to Visit   Medication Sig Dispense Refill    allopurinol (Zyloprim) 100 mg tablet Take 1 tablet (100 mg) by mouth once daily. 90 tablet 1    aspirin 81 mg chewable tablet Chew 1 tablet (81 mg) once daily.      atorvastatin (Lipitor) 40 mg tablet Take 1 tablet (40 mg) by mouth once daily at " bedtime. 90 tablet 3    Blood glucose monitoring meter kit kit 1 each once daily. 1 each 0    blood sugar diagnostic (ONE TOUCH TEST MISC) 1 each by in vitro route once daily. Machine Use brand preferred by patient and insurance coverage      blood sugar diagnostic (OneTouch Verio test strips) strip USE 1 STRIP DAILY.  what ever brand covered by insurance as they prefer 100 each 3    cholecalciferol (Vitamin D-3) 25 MCG (1000 UT) tablet Take 1 tablet (25 mcg) by mouth once daily.      doxazosin (Cardura) 8 mg tablet Take 1 tablet (8 mg) by mouth once daily. 90 tablet 3    fish oil concentrate (Omega-3) 120-180 mg capsule Take 1 capsule (1 g) by mouth once daily.      furosemide (Lasix) 40 mg tablet Take 1 tablet (40 mg) by mouth 2 times a day. 180 tablet 1    gabapentin (Neurontin) 300 mg capsule Take 1 capsule (300 mg) by mouth once daily at bedtime. 90 capsule 1    Jardiance 25 mg Take 1 tablet (25 mg) by mouth once daily. 90 tablet 1    losartan (Cozaar) 25 mg tablet Take 1 tablet (25 mg) by mouth once daily. 90 tablet 1    metFORMIN XR (Glucophage-XR) 750 mg 24 hr tablet Take 1 tablet (750 mg) by mouth once daily. 90 tablet 1    metoprolol succinate XL (Toprol-XL) 100 mg 24 hr tablet Take 1 tablet (100 mg) by mouth once daily. 90 tablet 1    multivitamin with folic acid (One Daily Multivitamin) 400 mcg tablet Take 1 tablet by mouth once daily.      mupirocin (Bactroban) 2 % ointment APPLY TO LEGS WITH TRIAMCINOLONE DAILY      naproxen sodium 220 mg capsule Take by mouth.      nettle-pumpkin-saw palm-min 17 (Prostate Therapy) capsule Take by mouth once daily.      potassium chloride CR 20 mEq ER tablet Take 1 tablet (20 mEq) by mouth once daily. 90 tablet 1    rivaroxaban (Xarelto) 20 mg tablet Take 1 tablet (20 mg) by mouth once daily in the evening. Take with meals. Take with food. 90 tablet 0    spironolactone (Aldactone) 25 mg tablet Take 1 tablet (25 mg) by mouth once daily. 90 tablet 1    triamcinolone  (Kenalog) 0.1 % cream APPLY DAILY TO LEGS.      valsartan-hydrochlorothiazide (Diovan-HCT) 160-25 mg tablet Take by mouth once daily.       No current facility-administered medications on file prior to visit.       Labs: (past 26 weeks)  Recent Results (from the past 4368 hour(s))   Comprehensive Metabolic Panel    Collection Time: 07/24/24 11:20 AM   Result Value Ref Range    Glucose 152 (H) 74 - 99 mg/dL    Sodium 137 136 - 145 mmol/L    Potassium 4.0 3.5 - 5.3 mmol/L    Chloride 99 98 - 107 mmol/L    Bicarbonate 26 21 - 32 mmol/L    Anion Gap 16 10 - 20 mmol/L    Urea Nitrogen 32 (H) 6 - 23 mg/dL    Creatinine 1.37 (H) 0.50 - 1.30 mg/dL    eGFR 55 (L) >60 mL/min/1.73m*2    Calcium 8.8 8.6 - 10.3 mg/dL    Albumin 3.8 3.4 - 5.0 g/dL    Alkaline Phosphatase 98 33 - 136 U/L    Total Protein 7.3 6.4 - 8.2 g/dL    AST 17 9 - 39 U/L    Bilirubin, Total 0.8 0.0 - 1.2 mg/dL    ALT 20 10 - 52 U/L   Lipid Panel    Collection Time: 07/24/24 11:20 AM   Result Value Ref Range    Cholesterol 95 0 - 199 mg/dL    HDL-Cholesterol 36.1 mg/dL    Cholesterol/HDL Ratio 2.6     LDL Calculated 31 <=99 mg/dL    VLDL 28 0 - 40 mg/dL    Triglycerides 140 0 - 149 mg/dL    Non HDL Cholesterol 59 0 - 149 mg/dL   Hemoglobin A1C    Collection Time: 07/24/24 11:20 AM   Result Value Ref Range    Hemoglobin A1C 7.3 (H) see below %    Estimated Average Glucose 163 Not Established mg/dL   CBC and Auto Differential    Collection Time: 07/24/24 11:20 AM   Result Value Ref Range    WBC 10.5 4.4 - 11.3 x10*3/uL    nRBC 0.0 0.0 - 0.0 /100 WBCs    RBC 5.08 4.50 - 5.90 x10*6/uL    Hemoglobin 14.7 13.5 - 17.5 g/dL    Hematocrit 45.1 41.0 - 52.0 %    MCV 89 80 - 100 fL    MCH 28.9 26.0 - 34.0 pg    MCHC 32.6 32.0 - 36.0 g/dL    RDW 16.0 (H) 11.5 - 14.5 %    Platelets 156 150 - 450 x10*3/uL    Neutrophils % 73.9 40.0 - 80.0 %    Immature Granulocytes %, Automated 0.4 0.0 - 0.9 %    Lymphocytes % 14.6 13.0 - 44.0 %    Monocytes % 8.1 2.0 - 10.0 %     Eosinophils % 2.3 0.0 - 6.0 %    Basophils % 0.7 0.0 - 2.0 %    Neutrophils Absolute 7.79 (H) 1.60 - 5.50 x10*3/uL    Immature Granulocytes Absolute, Automated 0.04 0.00 - 0.50 x10*3/uL    Lymphocytes Absolute 1.54 0.80 - 3.00 x10*3/uL    Monocytes Absolute 0.85 (H) 0.05 - 0.80 x10*3/uL    Eosinophils Absolute 0.24 0.00 - 0.40 x10*3/uL    Basophils Absolute 0.07 0.00 - 0.10 x10*3/uL   Uric Acid    Collection Time: 07/24/24 11:20 AM   Result Value Ref Range    Uric Acid 7.1 4.0 - 7.5 mg/dL   Prostate Specific Antigen    Collection Time: 07/24/24 11:20 AM   Result Value Ref Range    Prostate Specific AG 2.64 <=4.00 ng/mL   POCT UA Automated manually resulted    Collection Time: 08/05/24  1:18 PM   Result Value Ref Range    POC Glucose, Urine >=1000 (4+) (A) NEGATIVE mg/dl    POC Bilirubin, Urine NEGATIVE NEGATIVE    POC Ketones, Urine NEGATIVE NEGATIVE mg/dl    POC Specific Gravity, Urine 1.010 1.005 - 1.035    POC Blood, Urine NEGATIVE NEGATIVE    POC PH, Urine 6.0 No Reference Range Established PH    POC Protein, Urine NEGATIVE NEGATIVE, 30 (1+) mg/dl    POC Urobilinogen, Urine 1.0 0.2, 1.0 EU/DL    Poc Nitrite, Urine NEGATIVE NEGATIVE    POC Leukocytes, Urine NEGATIVE NEGATIVE       ECG  No results found for this or any previous visit (from the past 4464 hour(s)).    Echocardiogram  No results found for this or any previous visit from the past 1095 days.      CV Studies:  EKG:No results found for this or any previous visit (from the past 4464 hour(s)).  Echocardiogram:   Echocardiogram     [facility], [address] [place] [address]  Tel [phone] and Fax [phone]    TRANSTHORACIC ECHOCARDIOGRAM REPORT      Patient Name:     CORNELIO Orona Physician:  [id number] Matheus Hitchcock MD  Study Date:       9/13/2023          Referring           KEYSHA GUERRA  Physician:  MONY/PID:          [id number]           PCP:  Accession/Order#: [id number]          Department           [facility]  Location:           Invasive  YOB: 1950          Fellow:  Gender:           M                  Nurse:              Gisele Chacko RN  Admit Date:       9/12/2023          Sonographer:        Sarah Purvis RDCS,  SHLOMO  Admission Status: Inpatient - Time   Additional Staff:  Critical  Height:           187.96 cm          CC Report to:       Jayden T7 [facility]  Weight:           178.26 kg          Study Type:         Echocardiogram  BSA:              2.90 m2  Blood Pressure: 132 /64 mmHg    Diagnosis/ICD: Z95.2-Presence of prosthetic heart valve  Indication:    s/p TAVR  Procedure/CPT: Echo Limited-09673; Color Doppler-65883; Doppler Limited-23108    Patient History:  Diabetes:          Yes  Pertinent History: HTN, A-Fib and CHF. TAMARA, morbid obesity, CKD, AS /p TAVR  (34mm Medtronic 9/12/23).    Study Detail: The following Echo studies were performed: 2D, M-Mode, Doppler and  color flow. Technically challenging study due to body habitus and  poor acoustic windows. Definity used as a contrast agent for  endocardial border definition. Total contrast used for this  procedure was 3 mL via IV push.      PHYSICIAN INTERPRETATION:  Left Ventricle: The left ventricular systolic function is normal, with an estimated ejection fraction of 60-65%. The left ventricular cavity size is normal. Left ventricular diastolic filling was indeterminate.  Left Atrium: The left atrium was not well visualized.  Right Ventricle: The right ventricle was not well visualized. Unable to determine right ventricular systolic function.  Right Atrium: The right atrium was not well visualized.  Aortic Valve: The aortic valve was not well visualized. There is transcatheter aortic valve replacement. Echo findings are consistent with normal aortic valve prosthesis function. There is trivial aortic valve regurgitation. The peak instantaneous gradient of the aortic valve is 34.6 mmHg. The mean gradient of the aortic  valve is 16.1 mmHg.  Mitral Valve: The mitral valve was not well visualized. There is trace mitral valve regurgitation.  Tricuspid Valve: The tricuspid valve was not well visualized. There is trace tricuspid regurgitation.  Pulmonic Valve: The pulmonic valve is not well visualized. There is trace pulmonic valve regurgitation.  Pericardium: There is a trivial pericardial effusion.  Aorta: The aortic root was not well visualized. The aortic root is at the upper limits of normal size.      CONCLUSIONS:  1. Poorly visualized anatomical structures due to very suboptimal image quality.  2. Left ventricular systolic function is normal with a 60-65% estimated ejection fraction.  3. There is a transcatheter aortic valve replacement.    QUANTITATIVE DATA SUMMARY:  AORTIC VALVE:  Normal Ranges:  AoV Vmax:                2.94 m/s  (<=1.7m/s)  AoV Peak P.6 mmHg (<20mmHg)  AoV Mean P.1 mmHg (1.7-11.5mmHg)  LVOT Max Herman:            0.97 m/s  (<=1.1m/s)  AoV VTI:                 50.12 cm  (18-25cm)  LVOT VTI:                18.13 cm  AoV Dimensionless Index: 0.36      [id number] Matheus Hitchcock MD  Electronically signed on 2023 at 11:29:15 AM         Final     Stress Testing IMGRESULT([id number]:1:1825): No results found for this or any previous visit from the past 1825 days.    Cardiac Catheterization:   Adult Cath     Narrative  [facility], Cath Lab, [address] [place] [address]    Cardiovascular Catheterization Report    Patient Name:     CORNELIO S CIARA      Performing Physician:  [id number] Pollock MD  Study Date:       2023          Verifying Physician:   [id number] Pollock MD  MRN/PID:          [id number]           Cardiologist/Co-scrub:  Accession/Order#: [id number]          Fellow:                [id number] Jean Carlos Sanches MD  YOB: 1950          Fellow:  Gender:           M                  Referring Physician:   Artem Bauer DO  Surgeon:           Zoran Flowers MD Referring Physician:      Study: TAVR      Indications:  Severe aortic stenosis.    Transcatheter Aortic Valve Replacement (TAVR):  The right femoral artery was accessed using the transfemoral method. A 6 Fr sheath was inserted followed by the deployment of 2 Proglides. 14 Fr sentrant. The left femoral artery was accessed percutaneously and a 6 Palauan contralateral sheath was placed. A 7 Palauan temporary pacemaker was inserted through the right femoral vein and advanced to the right ventricular apex. Adequate pacing thresholds were obtained. Evolut Fx 34 mm valve was successfully deployed under rapid ventricular pacing at 160 BPM. Transthoracic echo performed post valve deployment revealed no central aortic insufficiency and trace/trivial paravalvular aortic insufficiency. No device related events. No bleeding events occurred during the procedure. No vascular access complications were revealed. Access site was closed using 2 ProGlide devices. Hemostasis was achieved in the left femoral artery using a ProGlide device. Temporary pacing wire was removed in lab.        Complications:  No vascular access complications were revealed. No bleeding events occurred during the procedure. No device related events.    Cardiac Cath Transition of Care Summary:  Post Procedure Diagnosis: Successful TAVR with 34 mm Evolut Fx.  Blood Loss:               Estimated blood loss during the procedure was 0 mls.  Specimens Removed:        Number of specimen(s) removed: none.    ____________________________________________________________________________________  CONCLUSIONS:  1. Successful TAVR with 34 mm Evolut Fx.  2. Patient was enrolled in a research study and data was included in the TVT Registry.    ____________________________________________________________________________________  CPT Codes:  MILLY Perc,femoral-68207.62    ICD 10 Codes:  I35.0-Nonrheumatic aortic (valve) stenosis    [id number] Peter Maris  MD  Performing Physician  Electronically signed by [id number] Peter Pollock MD on 9/13/2023 at 9:38:34 AM      cc Report to: [id number] Artem Bauer DO           Final   No results found for this or any previous visit from the past 3650 days.     Cardiac Scoring: No results found for this or any previous visit from the past 1825 days.    AAA : No results found for this or any previous visit from the past 1825 days.    OTHER: No results found for this or any previous visit from the past 1825 days.    LAST IMAGING RESULTS  ECG 12 Lead  Atrial fibrillation, heart rate 56 bpm, left bundle branch block.    Problem List Items Addressed This Visit       (HFpEF) heart failure with preserved ejection fraction - Primary    Ascending aorta dilatation (CMS-HCC)    Benign essential hypertension    CAD (coronary artery disease)    Diabetes mellitus, type 2 (Multi)    Hyperlipidemia    Persistent atrial fibrillation (Multi)    On rivaroxaban therapy    Morbid obesity (Multi)    SOBOE (shortness of breath on exertion)    S/P TAVR (transcatheter aortic valve replacement)          Artem Bauer DO, FACC, FACOI

## 2024-10-18 ENCOUNTER — APPOINTMENT (OUTPATIENT)
Dept: CARDIOLOGY | Facility: CLINIC | Age: 74
End: 2024-10-18
Payer: MEDICARE

## 2024-10-18 VITALS
HEART RATE: 58 BPM | WEIGHT: 315 LBS | SYSTOLIC BLOOD PRESSURE: 118 MMHG | DIASTOLIC BLOOD PRESSURE: 78 MMHG | HEIGHT: 73 IN | BODY MASS INDEX: 41.75 KG/M2

## 2024-10-18 DIAGNOSIS — R06.02 SOBOE (SHORTNESS OF BREATH ON EXERTION): ICD-10-CM

## 2024-10-18 DIAGNOSIS — E78.00 PURE HYPERCHOLESTEROLEMIA: ICD-10-CM

## 2024-10-18 DIAGNOSIS — I10 BENIGN ESSENTIAL HYPERTENSION: ICD-10-CM

## 2024-10-18 DIAGNOSIS — E11.00 TYPE 2 DIABETES MELLITUS WITH HYPEROSMOLARITY WITHOUT COMA, WITHOUT LONG-TERM CURRENT USE OF INSULIN (MULTI): ICD-10-CM

## 2024-10-18 DIAGNOSIS — I50.32 CHRONIC HEART FAILURE WITH PRESERVED EJECTION FRACTION: Primary | ICD-10-CM

## 2024-10-18 DIAGNOSIS — I25.10 CORONARY ARTERY DISEASE INVOLVING NATIVE CORONARY ARTERY OF NATIVE HEART WITHOUT ANGINA PECTORIS: ICD-10-CM

## 2024-10-18 DIAGNOSIS — I77.810 ASCENDING AORTA DILATATION (CMS-HCC): ICD-10-CM

## 2024-10-18 DIAGNOSIS — Z95.2 S/P TAVR (TRANSCATHETER AORTIC VALVE REPLACEMENT): ICD-10-CM

## 2024-10-18 DIAGNOSIS — E66.01 MORBID OBESITY (MULTI): ICD-10-CM

## 2024-10-18 DIAGNOSIS — I48.19 PERSISTENT ATRIAL FIBRILLATION (MULTI): ICD-10-CM

## 2024-10-18 DIAGNOSIS — Z79.01 ON RIVAROXABAN THERAPY: ICD-10-CM

## 2024-10-18 PROCEDURE — 99214 OFFICE O/P EST MOD 30 MIN: CPT | Performed by: INTERNAL MEDICINE

## 2024-10-18 PROCEDURE — 93005 ELECTROCARDIOGRAM TRACING: CPT | Performed by: INTERNAL MEDICINE

## 2024-10-18 PROCEDURE — 1036F TOBACCO NON-USER: CPT | Performed by: INTERNAL MEDICINE

## 2024-10-18 PROCEDURE — 1159F MED LIST DOCD IN RCRD: CPT | Performed by: INTERNAL MEDICINE

## 2024-10-18 PROCEDURE — 3008F BODY MASS INDEX DOCD: CPT | Performed by: INTERNAL MEDICINE

## 2024-10-18 PROCEDURE — 3051F HG A1C>EQUAL 7.0%<8.0%: CPT | Performed by: INTERNAL MEDICINE

## 2024-10-18 PROCEDURE — 4010F ACE/ARB THERAPY RXD/TAKEN: CPT | Performed by: INTERNAL MEDICINE

## 2024-10-18 PROCEDURE — 3078F DIAST BP <80 MM HG: CPT | Performed by: INTERNAL MEDICINE

## 2024-10-18 PROCEDURE — 3048F LDL-C <100 MG/DL: CPT | Performed by: INTERNAL MEDICINE

## 2024-10-18 PROCEDURE — 93010 ELECTROCARDIOGRAM REPORT: CPT | Performed by: INTERNAL MEDICINE

## 2024-10-18 PROCEDURE — 3074F SYST BP LT 130 MM HG: CPT | Performed by: INTERNAL MEDICINE

## 2024-11-05 ENCOUNTER — HOSPITAL ENCOUNTER (OUTPATIENT)
Dept: CARDIOLOGY | Facility: HOSPITAL | Age: 74
Discharge: HOME | End: 2024-11-05
Payer: MEDICARE

## 2024-11-05 DIAGNOSIS — E66.01 MORBID OBESITY (MULTI): ICD-10-CM

## 2024-11-05 DIAGNOSIS — E78.00 PURE HYPERCHOLESTEROLEMIA: ICD-10-CM

## 2024-11-05 DIAGNOSIS — E11.00 TYPE 2 DIABETES MELLITUS WITH HYPEROSMOLARITY WITHOUT COMA, WITHOUT LONG-TERM CURRENT USE OF INSULIN (MULTI): ICD-10-CM

## 2024-11-05 DIAGNOSIS — I25.10 CORONARY ARTERY DISEASE INVOLVING NATIVE CORONARY ARTERY OF NATIVE HEART WITHOUT ANGINA PECTORIS: ICD-10-CM

## 2024-11-05 DIAGNOSIS — Z95.2 S/P TAVR (TRANSCATHETER AORTIC VALVE REPLACEMENT): ICD-10-CM

## 2024-11-05 DIAGNOSIS — Z79.01 ON RIVAROXABAN THERAPY: ICD-10-CM

## 2024-11-05 DIAGNOSIS — I48.19 PERSISTENT ATRIAL FIBRILLATION (MULTI): ICD-10-CM

## 2024-11-05 DIAGNOSIS — I50.32 CHRONIC HEART FAILURE WITH PRESERVED EJECTION FRACTION: ICD-10-CM

## 2024-11-05 DIAGNOSIS — R06.02 SOBOE (SHORTNESS OF BREATH ON EXERTION): ICD-10-CM

## 2024-11-05 DIAGNOSIS — I77.810 ASCENDING AORTA DILATATION (CMS-HCC): ICD-10-CM

## 2024-11-05 DIAGNOSIS — I10 BENIGN ESSENTIAL HYPERTENSION: ICD-10-CM

## 2024-11-05 PROCEDURE — 2500000004 HC RX 250 GENERAL PHARMACY W/ HCPCS (ALT 636 FOR OP/ED): Performed by: INTERNAL MEDICINE

## 2024-11-05 PROCEDURE — 93306 TTE W/DOPPLER COMPLETE: CPT | Performed by: INTERNAL MEDICINE

## 2024-11-05 PROCEDURE — 93306 TTE W/DOPPLER COMPLETE: CPT

## 2024-11-06 ENCOUNTER — TELEPHONE (OUTPATIENT)
Dept: CARDIOLOGY | Facility: HOSPITAL | Age: 74
End: 2024-11-06
Payer: MEDICARE

## 2024-11-06 LAB
AORTIC VALVE MEAN GRADIENT: 9 MMHG
AORTIC VALVE PEAK VELOCITY: 2.06 M/S
AV PEAK GRADIENT: 17 MMHG
AVA (PEAK VEL): 1.95 CM2
AVA (VTI): 1.73 CM2
EJECTION FRACTION APICAL 4 CHAMBER: 72.9
EJECTION FRACTION: 58 %
LEFT ATRIUM VOLUME AREA LENGTH INDEX BSA: 25 ML/M2
LEFT VENTRICLE INTERNAL DIMENSION DIASTOLE: 5.2 CM (ref 3.5–6)
LEFT VENTRICULAR OUTFLOW TRACT DIAMETER: 2 CM
LV EJECTION FRACTION BIPLANE: 69 %
MITRAL VALVE E/E' RATIO: 13.3
RIGHT VENTRICLE PEAK SYSTOLIC PRESSURE: 12.4 MMHG

## 2024-11-06 NOTE — TELEPHONE ENCOUNTER
11/6/24  1109  Called results to patient with patient verbalizing understanding.    ----- Message from Artem Bauer sent at 11/6/2024  8:16 AM EST -----  Normal left ventricular systolic function with an ejection fraction of 55 to 60%, right ventricle not well-visualized, no significant valve abnormalities noted.

## 2024-12-03 DIAGNOSIS — I48.19 PERSISTENT ATRIAL FIBRILLATION (MULTI): ICD-10-CM

## 2024-12-03 NOTE — TELEPHONE ENCOUNTER
----- Message from Nurse Evette ZULETA sent at 12/3/2024  3:20 PM EST -----  Regarding: Refill  Patient is requesting a refill of Xarelto 20 mg to be sent to [facility] in [place]  
details…

## 2024-12-17 DIAGNOSIS — E78.5 HYPERLIPIDEMIA, UNSPECIFIED HYPERLIPIDEMIA TYPE: ICD-10-CM

## 2024-12-17 DIAGNOSIS — I25.10 CORONARY ARTERY DISEASE INVOLVING NATIVE CORONARY ARTERY OF NATIVE HEART WITHOUT ANGINA PECTORIS: ICD-10-CM

## 2024-12-17 DIAGNOSIS — Z79.01 ON RIVAROXABAN THERAPY: ICD-10-CM

## 2024-12-17 DIAGNOSIS — E66.01 MORBID OBESITY (MULTI): ICD-10-CM

## 2024-12-17 DIAGNOSIS — I10 BENIGN ESSENTIAL HYPERTENSION: ICD-10-CM

## 2024-12-17 DIAGNOSIS — R06.02 SOBOE (SHORTNESS OF BREATH ON EXERTION): ICD-10-CM

## 2024-12-17 DIAGNOSIS — E78.00 PURE HYPERCHOLESTEROLEMIA: ICD-10-CM

## 2024-12-17 DIAGNOSIS — E11.42 TYPE 2 DIABETES MELLITUS WITH DIABETIC POLYNEUROPATHY, WITHOUT LONG-TERM CURRENT USE OF INSULIN: ICD-10-CM

## 2024-12-17 DIAGNOSIS — M79.604 RIGHT LEG PAIN: ICD-10-CM

## 2024-12-17 DIAGNOSIS — E11.00 TYPE 2 DIABETES MELLITUS WITH HYPEROSMOLARITY WITHOUT COMA, WITHOUT LONG-TERM CURRENT USE OF INSULIN (MULTI): ICD-10-CM

## 2024-12-17 DIAGNOSIS — I48.19 PERSISTENT ATRIAL FIBRILLATION (MULTI): ICD-10-CM

## 2024-12-17 DIAGNOSIS — I77.810 ASCENDING AORTA DILATATION (CMS-HCC): ICD-10-CM

## 2024-12-17 DIAGNOSIS — I25.10 CORONARY ARTERY DISEASE INVOLVING NATIVE CORONARY ARTERY OF NATIVE HEART, UNSPECIFIED WHETHER ANGINA PRESENT: ICD-10-CM

## 2024-12-17 DIAGNOSIS — I50.32 CHRONIC HEART FAILURE WITH PRESERVED EJECTION FRACTION: ICD-10-CM

## 2024-12-17 DIAGNOSIS — I35.0 NONRHEUMATIC AORTIC VALVE STENOSIS: ICD-10-CM

## 2024-12-17 DIAGNOSIS — I48.19 PERSISTENT ATRIAL FIBRILLATION WITH RVR (MULTI): ICD-10-CM

## 2024-12-18 RX ORDER — METOPROLOL SUCCINATE 100 MG/1
100 TABLET, EXTENDED RELEASE ORAL DAILY
Qty: 90 TABLET | Refills: 3 | Status: SHIPPED | OUTPATIENT
Start: 2024-12-18

## 2025-02-01 LAB
ALBUMIN SERPL-MCNC: 3.9 G/DL (ref 3.6–5.1)
ALP SERPL-CCNC: 98 U/L (ref 35–144)
ALT SERPL-CCNC: 20 U/L (ref 9–46)
ANION GAP SERPL CALCULATED.4IONS-SCNC: 16 MMOL/L (CALC) (ref 7–17)
AST SERPL-CCNC: 22 U/L (ref 10–35)
BASOPHILS # BLD AUTO: 52 CELLS/UL (ref 0–200)
BASOPHILS NFR BLD AUTO: 0.5 %
BILIRUB SERPL-MCNC: 0.9 MG/DL (ref 0.2–1.2)
BUN SERPL-MCNC: 32 MG/DL (ref 7–25)
CALCIUM SERPL-MCNC: 9.1 MG/DL (ref 8.6–10.3)
CHLORIDE SERPL-SCNC: 97 MMOL/L (ref 98–110)
CHOLEST SERPL-MCNC: 98 MG/DL
CHOLEST/HDLC SERPL: 2.6 (CALC)
CO2 SERPL-SCNC: 23 MMOL/L (ref 20–32)
CREAT SERPL-MCNC: 1.4 MG/DL (ref 0.7–1.28)
EGFRCR SERPLBLD CKD-EPI 2021: 53 ML/MIN/1.73M2
EOSINOPHIL # BLD AUTO: 227 CELLS/UL (ref 15–500)
EOSINOPHIL NFR BLD AUTO: 2.2 %
ERYTHROCYTE [DISTWIDTH] IN BLOOD BY AUTOMATED COUNT: 15 % (ref 11–15)
EST. AVERAGE GLUCOSE BLD GHB EST-MCNC: 160 MG/DL
EST. AVERAGE GLUCOSE BLD GHB EST-SCNC: 8.9 MMOL/L
GLUCOSE SERPL-MCNC: 145 MG/DL (ref 65–99)
HBA1C MFR BLD: 7.2 % OF TOTAL HGB
HCT VFR BLD AUTO: 43.2 % (ref 38.5–50)
HDLC SERPL-MCNC: 38 MG/DL
HGB BLD-MCNC: 14 G/DL (ref 13.2–17.1)
LDLC SERPL CALC-MCNC: 40 MG/DL (CALC)
LYMPHOCYTES # BLD AUTO: 1524 CELLS/UL (ref 850–3900)
LYMPHOCYTES NFR BLD AUTO: 14.8 %
MCH RBC QN AUTO: 28.9 PG (ref 27–33)
MCHC RBC AUTO-ENTMCNC: 32.4 G/DL (ref 32–36)
MCV RBC AUTO: 89.3 FL (ref 80–100)
MONOCYTES # BLD AUTO: 670 CELLS/UL (ref 200–950)
MONOCYTES NFR BLD AUTO: 6.5 %
NEUTROPHILS # BLD AUTO: 7828 CELLS/UL (ref 1500–7800)
NEUTROPHILS NFR BLD AUTO: 76 %
NONHDLC SERPL-MCNC: 60 MG/DL (CALC)
PLATELET # BLD AUTO: 152 THOUSAND/UL (ref 140–400)
PMV BLD REES-ECKER: 11.6 FL (ref 7.5–12.5)
POTASSIUM SERPL-SCNC: 4.3 MMOL/L (ref 3.5–5.3)
PROT SERPL-MCNC: 7.6 G/DL (ref 6.1–8.1)
RBC # BLD AUTO: 4.84 MILLION/UL (ref 4.2–5.8)
SODIUM SERPL-SCNC: 136 MMOL/L (ref 135–146)
TRIGL SERPL-MCNC: 110 MG/DL
WBC # BLD AUTO: 10.3 THOUSAND/UL (ref 3.8–10.8)

## 2025-02-04 ENCOUNTER — APPOINTMENT (OUTPATIENT)
Dept: PRIMARY CARE | Facility: CLINIC | Age: 75
End: 2025-02-04
Payer: MEDICARE

## 2025-02-04 VITALS
HEART RATE: 56 BPM | DIASTOLIC BLOOD PRESSURE: 65 MMHG | OXYGEN SATURATION: 96 % | SYSTOLIC BLOOD PRESSURE: 112 MMHG | WEIGHT: 315 LBS | TEMPERATURE: 97.3 F | BODY MASS INDEX: 52.11 KG/M2

## 2025-02-04 DIAGNOSIS — I50.9 HEART FAILURE, UNSPECIFIED HF CHRONICITY, UNSPECIFIED HEART FAILURE TYPE: ICD-10-CM

## 2025-02-04 DIAGNOSIS — Z95.2 S/P TAVR (TRANSCATHETER AORTIC VALVE REPLACEMENT): ICD-10-CM

## 2025-02-04 DIAGNOSIS — E11.42 DIABETIC POLYNEUROPATHY ASSOCIATED WITH TYPE 2 DIABETES MELLITUS (MULTI): ICD-10-CM

## 2025-02-04 DIAGNOSIS — G47.33 OSA ON CPAP: ICD-10-CM

## 2025-02-04 DIAGNOSIS — E66.01 CLASS 3 SEVERE OBESITY DUE TO EXCESS CALORIES WITH SERIOUS COMORBIDITY AND BODY MASS INDEX (BMI) OF 50.0 TO 59.9 IN ADULT: ICD-10-CM

## 2025-02-04 DIAGNOSIS — N13.8 BPH WITH OBSTRUCTION/LOWER URINARY TRACT SYMPTOMS: ICD-10-CM

## 2025-02-04 DIAGNOSIS — Z12.11 ENCOUNTER FOR SCREENING FOR MALIGNANT NEOPLASM OF COLON: ICD-10-CM

## 2025-02-04 DIAGNOSIS — E11.42 TYPE 2 DIABETES MELLITUS WITH DIABETIC POLYNEUROPATHY, WITHOUT LONG-TERM CURRENT USE OF INSULIN: ICD-10-CM

## 2025-02-04 DIAGNOSIS — I48.0 PAROXYSMAL ATRIAL FIBRILLATION (MULTI): ICD-10-CM

## 2025-02-04 DIAGNOSIS — N40.1 BPH WITH OBSTRUCTION/LOWER URINARY TRACT SYMPTOMS: ICD-10-CM

## 2025-02-04 DIAGNOSIS — K64.9 HEMORRHOIDS, UNSPECIFIED HEMORRHOID TYPE: ICD-10-CM

## 2025-02-04 DIAGNOSIS — E66.813 CLASS 3 SEVERE OBESITY DUE TO EXCESS CALORIES WITH SERIOUS COMORBIDITY AND BODY MASS INDEX (BMI) OF 50.0 TO 59.9 IN ADULT: ICD-10-CM

## 2025-02-04 DIAGNOSIS — E79.0 ASYMPTOMATIC HYPERURICEMIA: ICD-10-CM

## 2025-02-04 DIAGNOSIS — Z00.00 MEDICARE ANNUAL WELLNESS VISIT, SUBSEQUENT: Primary | ICD-10-CM

## 2025-02-04 PROCEDURE — 99397 PER PM REEVAL EST PAT 65+ YR: CPT | Performed by: INTERNAL MEDICINE

## 2025-02-04 PROCEDURE — 3074F SYST BP LT 130 MM HG: CPT | Performed by: INTERNAL MEDICINE

## 2025-02-04 PROCEDURE — 1124F ACP DISCUSS-NO DSCNMKR DOCD: CPT | Performed by: INTERNAL MEDICINE

## 2025-02-04 PROCEDURE — G0444 DEPRESSION SCREEN ANNUAL: HCPCS | Performed by: INTERNAL MEDICINE

## 2025-02-04 PROCEDURE — 1170F FXNL STATUS ASSESSED: CPT | Performed by: INTERNAL MEDICINE

## 2025-02-04 PROCEDURE — G0442 ANNUAL ALCOHOL SCREEN 15 MIN: HCPCS | Performed by: INTERNAL MEDICINE

## 2025-02-04 PROCEDURE — 3078F DIAST BP <80 MM HG: CPT | Performed by: INTERNAL MEDICINE

## 2025-02-04 PROCEDURE — 99214 OFFICE O/P EST MOD 30 MIN: CPT | Performed by: INTERNAL MEDICINE

## 2025-02-04 PROCEDURE — 1159F MED LIST DOCD IN RCRD: CPT | Performed by: INTERNAL MEDICINE

## 2025-02-04 PROCEDURE — 1036F TOBACCO NON-USER: CPT | Performed by: INTERNAL MEDICINE

## 2025-02-04 PROCEDURE — G0439 PPPS, SUBSEQ VISIT: HCPCS | Performed by: INTERNAL MEDICINE

## 2025-02-04 PROCEDURE — 4010F ACE/ARB THERAPY RXD/TAKEN: CPT | Performed by: INTERNAL MEDICINE

## 2025-02-04 RX ORDER — ALLOPURINOL 100 MG/1
100 TABLET ORAL DAILY
Qty: 90 TABLET | Refills: 1 | Status: SHIPPED | OUTPATIENT
Start: 2025-02-04 | End: 2025-08-03

## 2025-02-04 RX ORDER — GABAPENTIN 300 MG/1
300 CAPSULE ORAL NIGHTLY
Qty: 90 CAPSULE | Refills: 1 | Status: SHIPPED | OUTPATIENT
Start: 2025-02-04 | End: 2025-08-03

## 2025-02-04 RX ORDER — METFORMIN HYDROCHLORIDE 750 MG/1
750 TABLET, EXTENDED RELEASE ORAL DAILY
Qty: 90 TABLET | Refills: 1 | Status: SHIPPED | OUTPATIENT
Start: 2025-02-04 | End: 2025-08-03

## 2025-02-04 ASSESSMENT — ACTIVITIES OF DAILY LIVING (ADL)
GROCERY_SHOPPING: INDEPENDENT
MANAGING_FINANCES: INDEPENDENT
DRESSING: INDEPENDENT
TAKING_MEDICATION: INDEPENDENT
BATHING: INDEPENDENT
DOING_HOUSEWORK: INDEPENDENT

## 2025-02-04 ASSESSMENT — ENCOUNTER SYMPTOMS
OCCASIONAL FEELINGS OF UNSTEADINESS: 1
EYES NEGATIVE: 1
MUSCULOSKELETAL NEGATIVE: 1
ALLERGIC/IMMUNOLOGIC NEGATIVE: 1
HEMATOLOGIC/LYMPHATIC NEGATIVE: 1
NEUROLOGICAL NEGATIVE: 1
LOSS OF SENSATION IN FEET: 1
CONSTITUTIONAL NEGATIVE: 1
GASTROINTESTINAL NEGATIVE: 1
CARDIOVASCULAR NEGATIVE: 1
PSYCHIATRIC NEGATIVE: 1
ENDOCRINE NEGATIVE: 1
DEPRESSION: 0
RESPIRATORY NEGATIVE: 1

## 2025-02-04 NOTE — PROGRESS NOTES
Subjective   Patient ID: Fermin Coronado is a 74 y.o. male who presents for 6 month follow up and Medicare Annual Wellness Visit Subsequent.  Patient presents in follow up re:  Type 2 diabetes.  Patient has been compliant with medical therapy as prescribed and mostly compliant with diet and exercise recommendations.  HgbA1c has been maintained at goal level.  No hypoglycemia reported and no other associated complaints.          Review of Systems   Constitutional: Negative.    HENT: Negative.     Eyes: Negative.    Respiratory: Negative.     Cardiovascular: Negative.    Gastrointestinal: Negative.    Endocrine: Negative.    Genitourinary: Negative.    Musculoskeletal: Negative.    Skin: Negative.    Allergic/Immunologic: Negative.    Neurological: Negative.    Hematological: Negative.    Psychiatric/Behavioral: Negative.         Objective     Blood pressure 112/65, pulse 56, temperature 36.3 °C (97.3 °F), temperature source Temporal, weight (!) 179 kg (395 lb), SpO2 96%.   Physical Exam  Vitals reviewed.   Constitutional:       Appearance: Normal appearance. He is obese.   HENT:      Head: Normocephalic and atraumatic.      Right Ear: External ear normal.      Left Ear: External ear normal.      Nose: Nose normal.      Mouth/Throat:      Mouth: Mucous membranes are moist.      Pharynx: Oropharynx is clear.   Eyes:      Conjunctiva/sclera: Conjunctivae normal.      Pupils: Pupils are equal, round, and reactive to light.   Neck:      Vascular: No carotid bruit.   Cardiovascular:      Rate and Rhythm: Normal rate and regular rhythm.      Pulses: Normal pulses.      Heart sounds: Normal heart sounds. No murmur heard.  Pulmonary:      Effort: Pulmonary effort is normal.      Breath sounds: Normal breath sounds. No wheezing or rales.   Abdominal:      General: Abdomen is flat. There is no distension.      Palpations: Abdomen is soft.      Tenderness: There is no abdominal tenderness. There is no right CVA tenderness or left  CVA tenderness.   Musculoskeletal:         General: Normal range of motion.      Cervical back: Normal range of motion and neck supple.   Skin:     General: Skin is warm and dry.   Neurological:      General: No focal deficit present.      Mental Status: He is alert and oriented to person, place, and time.   Psychiatric:         Mood and Affect: Mood normal.         Behavior: Behavior normal.         Assessment/Plan   Problem List Items Addressed This Visit       Asymptomatic hyperuricemia    Relevant Medications    allopurinol (Zyloprim) 100 mg tablet    Other Relevant Orders    Uric Acid    BPH with obstruction/lower urinary tract symptoms    Diabetes mellitus, type 2 (Multi)    Relevant Medications    metFORMIN XR (Glucophage-XR) 750 mg 24 hr tablet    Other Relevant Orders    Hemoglobin A1C    Comprehensive Metabolic Panel    Lipid Panel    Albumin-Creatinine Ratio, Urine Random    Diabetic neuropathy (Multi)    Relevant Medications    gabapentin (Neurontin) 300 mg capsule    TAMARA on CPAP    Paroxysmal atrial fibrillation (Multi)    S/P TAVR (transcatheter aortic valve replacement)     Other Visit Diagnoses       Medicare annual wellness visit, subsequent    -  Primary    Heart failure, unspecified HF chronicity, unspecified heart failure type        Class 3 severe obesity due to excess calories with serious comorbidity and body mass index (BMI) of 50.0 to 59.9 in adult        Hemorrhoids, unspecified hemorrhoid type        Relevant Orders    Referral to General Surgery    Encounter for screening for malignant neoplasm of colon        Relevant Orders    Referral to General Surgery          Medicare annual wellness completed with no new findings or issues identified.  He is up to date on preventive measures other than not ever having colonoscopy.  This is discussed and recommended.  Patient does not have advanced care planning in place.  This is discussed and form is offered today.  Depression screen is completed  "utilizing PHQ-2 with score of zero (0).  Alcohol screen is completed with no issues identified.  Physical exam completed as documented with no new findings.  Sugars well controlled overall with A1c at goal though A1c has been rising over the past year.  Will continue present medical therapy and follow up.  Pt. advised on improving dietary choices and portion control as well as increasing exercise to promote weight loss.   Neuropathy overall well compensated on current therapy.  Will continue present therapy and follow.  He continues follow up with Cardiology for management of cardiac issues.  Pt. continues to use BiPAP nightly and is tolerating well.  Advised to continue treatment and will continue to follow clinically.   He continues annual follow up with Urology as well.  He has a new c/o of hemorrhoids which he states \"won't go away.\"  Will refer to Dr. Pemberton for this and screening colonoscopy.      Follow up in 6 months  Lab to be drawn 1 week prior to next office visit.        "

## 2025-03-06 ENCOUNTER — TELEPHONE (OUTPATIENT)
Dept: CARDIOLOGY | Facility: HOSPITAL | Age: 75
End: 2025-03-06
Payer: MEDICARE

## 2025-03-06 NOTE — TELEPHONE ENCOUNTER
Patient called office asking to resched his appt with Dr. Baeur. Patient was made aware Dr. Bauer is booked a couple of months out. Patient asked is he could see an BERT. Called patient back to confirm he can (no answer) lvm to call back to schedule.

## 2025-03-14 ENCOUNTER — APPOINTMENT (OUTPATIENT)
Dept: CARDIOLOGY | Facility: HOSPITAL | Age: 75
End: 2025-03-14
Payer: MEDICARE

## 2025-04-11 PROBLEM — N18.9 CKD (CHRONIC KIDNEY DISEASE): Status: ACTIVE | Noted: 2025-04-11

## 2025-04-11 NOTE — PROGRESS NOTES
[facility] Heart and Vascular Cardiology    Patient Name: Fermin Coronado  Patient : 1950    Scribe Attestation  By signing my name below, Maria Dolores DE JESUS Scribe attest that this documentation has been prepared under the direction and in the presence of Artem Bauer DO.    Physician Attestation  Artem DE JESUS DO, personally performed the services described in the documentation as scribed by Maria Dolores Goodman in my presence, and confirm it is both accurate and complete.    Reason for visit:  This is a 74-year-old male here for follow-up regarding HFpEF/shortness of breath, persistent atrial fibrillation, anticoagulation with rivaroxaban, history of aortic valve stenosis status post TAVR done in 2023, dilated ascending aorta, mild diffuse coronary artery disease as seen on cardiac catheterization done in 2023, hypertension, dyslipidemia, diabetes mellitus, CKD, and morbid obesity.      HPI:  This is a 74-year-old male here for follow-up regarding HFpEF/shortness of breath, persistent atrial fibrillation, anticoagulation with rivaroxaban, history of aortic valve stenosis status post TAVR done in 2023, dilated ascending aorta, mild diffuse coronary artery disease as seen on cardiac catheterization done in 2023, hypertension, dyslipidemia, diabetes mellitus, CKD, and morbid obesity.  Patient was last evaluated by me in 2024.  At that visit I ordered an echocardiogram, ordered blood work including BMP/CBC/magnesium be drawn in 6 months, and asked the patient to follow-up in 6 months and sooner if necessary.  CMP done in 2025 showed normal serum sodium and potassium with a serum creatinine of 1.40 consistent with known CKD, normal ALT/AST, hemoglobin A1c was 7.2%, CBC showed a hemoglobin of 14.0.  Lipid panel done in 2025 showed an LDL cholesterol 40 and triglycerides of 110 while on atorvastatin 40 mg daily.  Echocardiogram done in  November 2024 showed normal left ventricular systolic function with an ejection fraction of 55 to 60%, right ventricle not well-visualized, no significant valve abnormalities noted. ECG done today showed atrial fibrillation with a heart rate of 51 bpm.  The patient reports that he has been feeling generally well from the cardiac standpoint. He denies any new chest pain, shortness of breath, palpitations and lightheadedness. He states that he takes all of his medications as prescribed. During my exam, he was resting comfortably on the exam table.            Assessment/Plan:   1. HFpEF/shortness of breath  The patient has a history of HFpEF.  Echocardiogram done in November 2024 showed normal left ventricular systolic function with an ejection fraction of 55 to 60%, right ventricle not well-visualized, no significant valve abnormalities noted.   He does not appear significantly volume overloaded on exam today except for trace to 1+ pitting bilateral lower extremity edema.  He should continue current cardiac medications.  Recent lab works as noted in the HPI.  Lab works as noted below will be done in 6 months prior to his next visit.   I discussed with him the importance of following a low-sodium heart healthy diet as well as weight loss, wearing compression stockings and elevating legs when seated.   Follow up in 6 months and sooner if necessary.      2. Persistent atrial fibrillation  The patient has a history of persistent atrial fibrillation, on rivaroxaban for thromboembolism prophylaxis, which should be continued.  He should continue metoprolol succinate for heart rate control.  ECG done today showed atrial fibrillation with a heart rate of 51 bpm.    He denies palpitations or lightheadedness.   Echocardiogram done in November 2024 showed normal left ventricular systolic function with an ejection fraction of 55 to 60%, right ventricle not well-visualized, no significant valve abnormalities noted.   Recent lab  works as noted in the HPI.   Lab works as noted below will be done in 6 months prior to his next visit.   Follow up in 6 months and sooner if necessary.      3. Anticoagulation with rivaroxaban  The patient is on anticoagulation with rivaroxaban for atrial fibrillation.  Recent lab works as noted in the HPI.  Lab works as noted below will be done in 6 months prior to his next visit.      4. Aortic valve stenosis  The patient has a history of aortic valve stenosis status post TAVR on 9/12/2023 with an evolute fracture 34 mm valve.   Echocardiogram done in November 2024 showed normal left ventricular systolic function with an ejection fraction of 55 to 60%, right ventricle not well-visualized, no significant valve abnormalities noted.   He should continue to follow with the [facility].      5. Dilated ascending aorta  The patient was noted to have a moderately dilated ascending aorta measured at 4.4 cm on prior echocardiogram.  Echocardiogram done in November 2024 showed normal left ventricular systolic function with an ejection fraction of 55 to 60%, right ventricle not well-visualized, no significant valve abnormalities noted.   Continue risk factor modification.     6. Coronary artery disease  The patient has a history of mild diffuse coronary artery disease as seen on cardiac catheterization done in August 2023.  ECG done today showed atrial fibrillation with a heart rate of 51 bpm.    He denies anginal chest discomfort.  Blood pressure appears controlled on exam today.  He should continue current antihypertensive medications and antiplatelet therapy.  Echocardiogram done in November 2024 showed normal left ventricular systolic function with an ejection fraction of 55 to 60%, right ventricle not well-visualized, no significant valve abnormalities noted.    Recent lab works as noted in the HPI.  Lipid panel done in January 2025 showed an LDL cholesterol 40 and triglycerides of 110 while on  atorvastatin 40 mg daily.    Lab works as noted below will be done in 6 months prior to his next visit.   Please see lifestyle recommendations below.  Follow up in 6 months and sooner if necessary.      7. Hypertension  The patient has a history of hypertension which appears controlled on exam today.  He should continue his current antihypertensive medications and monitor his blood pressure at home.      8. Dyslipidemia  Lipid panel done in January 2025 showed an LDL cholesterol 40 and triglycerides of 110 while on atorvastatin 40 mg daily.    Please see lifestyle recommendations below.      9. Diabetes Mellitus  Hemoglobin A1c done in January 2025 was 7.2%.  Medication management as per PCP.     10. CKD  Serum creatinine done in July 2024 was 1.40 consistent with known CKD.  Management as per PCP.      11. Morbid obesity  Please see lifestyle recommendations below.        Orders:   BMP/BNP/CBC/magnesium in 6 months,   Follow-up in 6 months.    Lifestyle Recommendations  I recommend a whole-food plant-based diet, an eating pattern that encourages the consumption of unrefined plant foods (such as fruits, vegetables, tubers, whole grains, legumes, nuts and seeds) and discourages meats, dairy products, eggs and processed foods.     The AHA/ACC recommends that the patient consume a dietary pattern that emphasizes intake of vegetables, fruits, and whole grains; includes low-fat dairy products, poultry, fish, legumes, non-tropical vegetable oils, and nuts; and limits intake of sodium, sweets, sugar-sweetened beverages, and red meats.  Adapt this dietary pattern to appropriate calorie requirements (a 500-750 kcal/day deficit to loose weight), personal and cultural food preferences, and nutrition therapy for other medical conditions (including diabetes).  Achieve this pattern by following plans such as the [place] Mediterranean, DASH dietary pattern, or AHA diet.     Engage in 2 hours and 30 minutes per week of  moderate-intensity physical activity, or 1 hour and 15 minutes (75 minutes) per week of vigorous-intensity aerobic physical activity, or an equivalent combination of moderate and vigorous-intensity aerobic physical activity. Aerobic activity should be performed in episodes of at least 10 minutes preferably spread throughout the week.     Adhering to a heart healthy diet, regular exercise habits, avoidance of tobacco products, and maintenance of a healthy weight are crucial components of their heart disease risk reduction.     Any positive review of systems not specifically addressed in the office visit today should be evaluated and treated by the patients primary care physician or in an emergency department if necessary     Patient was notified that results from ordered tests will be called to the patient if it changes current management; it will otherwise be discussed at a future appointment and available on  [employer].     Thank you for allowing me to participate in the care of this patient.        This document was generated using the assistance of voice recognition software. If there are any errors of spelling, grammar, syntax, or meaning; please feel free to contact me directly for clarification.    Past Medical History:  He has a past medical history of Benign prostatic hyperplasia without lower urinary tract symptoms, Personal history of other endocrine, nutritional and metabolic disease, and Unspecified fracture of shaft of humerus, right arm, initial encounter for closed fracture.    Past Surgical History:  He has a past surgical history that includes Tonsillectomy (09/13/2016); Cholecystectomy (09/13/2016); and Cardiac surgery.      Social History:  He reports that he quit smoking about 46 years ago. His smoking use included cigarettes. He started smoking about 50 years ago. He has a 2 pack-year smoking history. He quit smokeless tobacco use about 45 years ago.  His smokeless tobacco use included chew. He  reports that he does not currently use alcohol. He reports that he does not use drugs.    Family History:  Family History   Problem Relation Name Age of Onset    Hypertension Mother      Coronary artery disease Mother      Breast cancer Mother      Hypertension Father      Hypertension Sister      Diabetes Sister      Hyperlipidemia Sister          Allergies:  Patient has no known allergies.    Outpatient Medications:  Current Outpatient Medications   Medication Instructions    allopurinol (ZYLOPRIM) 100 mg, oral, Daily    aspirin 81 mg chewable tablet 1 tablet, Daily    atorvastatin (LIPITOR) 40 mg, oral, Nightly    Blood glucose monitoring meter kit kit 1 each, miscellaneous, Daily    blood sugar diagnostic (ONE TOUCH TEST MISC) 1 each, Daily    blood sugar diagnostic (OneTouch Verio test strips) strip USE 1 STRIP DAILY.  what ever brand covered by insurance as they prefer    cholecalciferol (Vitamin D-3) 25 MCG (1000 UT) tablet 1 tablet, Daily    doxazosin (CARDURA) 8 mg, oral, Daily    fish oil concentrate (Omega-3) 120-180 mg capsule 1 capsule, Daily    furosemide (LASIX) 40 mg, oral, 2 times daily    gabapentin (NEURONTIN) 300 mg, oral, Nightly    Jardiance 25 mg, oral, Daily    losartan (COZAAR) 25 mg, oral, Daily    metFORMIN XR (GLUCOPHAGE-XR) 750 mg, oral, Daily    metoprolol succinate XL (TOPROL-XL) 100 mg, oral, Daily    multivitamin with folic acid (One Daily Multivitamin) 400 mcg tablet 1 tablet, Daily    mupirocin (Bactroban) 2 % ointment APPLY TO LEGS WITH TRIAMCINOLONE DAILY    naproxen sodium 220 mg capsule Take by mouth.    nettle-pumpkin-saw palm-min 17 (Prostate Therapy) capsule Daily    rivaroxaban (XARELTO) 20 mg, oral, Daily with evening meal, Take with food.    spironolactone (ALDACTONE) 25 mg, oral, Daily    triamcinolone (Kenalog) 0.1 % cream APPLY DAILY TO LEGS.    valsartan-hydrochlorothiazide (Diovan-HCT) 160-25 mg tablet Daily RT        ROS:  A 14 point review of systems was done and  "is negative other than as stated in HPI    Vitals:      10/13/2023    12:35 PM 1/25/2024     1:16 PM 4/5/2024     1:12 PM 8/1/2024     1:34 PM 8/5/2024     1:16 PM 10/18/2024     1:27 PM 2/4/2025     1:15 PM   Vitals   Systolic 112 131 128 100  118 112   Diastolic 72 83 82 60  78 65   BP Location    Left arm  Left arm Left arm   Heart Rate 62 66 56 65  58 56   Temp  35.7 °C (96.3 °F)  36.4 °C (97.6 °F)   36.3 °C (97.3 °F)   Resp  16        Height 1.88 m (6' 2\") 1.88 m (6' 2\") 1.88 m (6' 2\")  1.854 m (6' 1\") 1.854 m (6' 1\")    Weight (lb) 396 397.4 396 399.8 395 395 395   BMI 50.84 kg/m2 51.02 kg/m2 50.84 kg/m2 51.33 kg/m2 52.11 kg/m2 52.11 kg/m2 52.11 kg/m2   BSA (m2) 3.07 m2 3.07 m2 3.07 m2 3.07 m2 3.04 m2 3.04 m2 3.04 m2        Physical Exam:   Constitutional: Cooperative, in no acute distress, alert, appears stated age.  Skin: Skin color, texture, turgor normal. No rashes or lesions.  Head: Normocephalic. No masses, lesions, tenderness or abnormalities  Eyes: Extraocular movements are grossly intact.  Mouth and throat: Mucous membranes moist  Neck: Neck supple, no carotid bruits, no JVD  Respiratory: Lungs clear to auscultation, no wheezing or rhonchi, no use of accessory muscles  Chest wall: No scars, normal excursion with respiration  Cardiovascular: Irregular rhythm without murmur  Gastrointestinal: Abdomen soft, nontender. Bowel sounds normal.  Musculoskeletal: Strength equal in upper extremities  Extremities: Trace to 1+ pitting edema  Neurologic: Sensation grossly intact, alert and oriented ×3        Intake/Output:   No intake/output data recorded.    Outpatient Medications  Current Outpatient Medications on File Prior to Visit   Medication Sig Dispense Refill    allopurinol (Zyloprim) 100 mg tablet Take 1 tablet (100 mg) by mouth once daily. 90 tablet 1    aspirin 81 mg chewable tablet Chew 1 tablet (81 mg) once daily.      atorvastatin (Lipitor) 40 mg tablet Take 1 tablet (40 mg) by mouth once daily at " bedtime. 90 tablet 3    Blood glucose monitoring meter kit kit 1 each once daily. 1 each 0    blood sugar diagnostic (ONE TOUCH TEST MISC) 1 each by in vitro route once daily. Machine Use brand preferred by patient and insurance coverage      blood sugar diagnostic (OneTouch Verio test strips) strip USE 1 STRIP DAILY.  what ever brand covered by insurance as they prefer 100 each 3    cholecalciferol (Vitamin D-3) 25 MCG (1000 UT) tablet Take 1 tablet (25 mcg) by mouth once daily.      doxazosin (Cardura) 8 mg tablet Take 1 tablet (8 mg) by mouth once daily. 90 tablet 3    fish oil concentrate (Omega-3) 120-180 mg capsule Take 1 capsule (1 g) by mouth once daily.      furosemide (Lasix) 40 mg tablet Take 1 tablet (40 mg) by mouth 2 times a day. 180 tablet 1    gabapentin (Neurontin) 300 mg capsule Take 1 capsule (300 mg) by mouth once daily at bedtime. 90 capsule 1    Jardiance 25 mg Take 1 tablet (25 mg) by mouth once daily. 90 tablet 1    losartan (Cozaar) 25 mg tablet Take 1 tablet (25 mg) by mouth once daily. 90 tablet 1    metFORMIN XR (Glucophage-XR) 750 mg 24 hr tablet Take 1 tablet (750 mg) by mouth once daily. 90 tablet 1    metoprolol succinate XL (Toprol-XL) 100 mg 24 hr tablet Take 1 tablet (100 mg) by mouth once daily. 90 tablet 3    multivitamin with folic acid (One Daily Multivitamin) 400 mcg tablet Take 1 tablet by mouth once daily.      mupirocin (Bactroban) 2 % ointment APPLY TO LEGS WITH TRIAMCINOLONE DAILY (Patient not taking: Reported on 2/4/2025)      naproxen sodium 220 mg capsule Take by mouth.      nettle-pumpkin-saw palm-min 17 (Prostate Therapy) capsule Take by mouth once daily.      rivaroxaban (Xarelto) 20 mg tablet Take 1 tablet (20 mg) by mouth once daily in the evening. Take with meals. Take with food. 90 tablet 1    spironolactone (Aldactone) 25 mg tablet Take 1 tablet (25 mg) by mouth once daily. 90 tablet 1    triamcinolone (Kenalog) 0.1 % cream APPLY DAILY TO LEGS.       valsartan-hydrochlorothiazide (Diovan-HCT) 160-25 mg tablet Take by mouth once daily.       No current facility-administered medications on file prior to visit.       Labs: (past 26 weeks)  Recent Results (from the past 26 weeks)   Transthoracic Echo (TTE) Complete    Collection Time: 11/05/24  2:33 PM   Result Value Ref Range    LVOT diam 2.00 cm    LV Biplane EF 69 %    MV avg E/e' ratio 13.30     AV mn grad 9 mmHg    LA vol index A/L 25.0 ml/m2    AV pk amy 2.06 m/s    LV EF 58 %    RVSP 12.4 mmHg    LVIDd 5.20 cm    Aortic Valve Area by Continuity of VTI 1.73 cm2    Aortic Valve Area by Continuity of Peak Velocity 1.95 cm2    AV pk grad 17 mmHg    LV A4C EF 72.9    Lipid Panel    Collection Time: 01/31/25  1:02 PM   Result Value Ref Range    CHOLESTEROL, TOTAL 98 <200 mg/dL    HDL CHOLESTEROL 38 (L) > OR = 40 mg/dL    TRIGLYCERIDES 110 <150 mg/dL    LDL-CHOLESTEROL 40 mg/dL (calc)    CHOL/HDLC RATIO 2.6 <5.0 (calc)    NON HDL CHOLESTEROL 60 <130 mg/dL (calc)   Comprehensive Metabolic Panel    Collection Time: 01/31/25  1:02 PM   Result Value Ref Range    GLUCOSE 145 (H) 65 - 99 mg/dL    UREA NITROGEN (BUN) 32 (H) 7 - 25 mg/dL    CREATININE 1.40 (H) 0.70 - 1.28 mg/dL    EGFR 53 (L) > OR = 60 mL/min/1.73m2    SODIUM 136 135 - 146 mmol/L    POTASSIUM 4.3 3.5 - 5.3 mmol/L    CHLORIDE 97 (L) 98 - 110 mmol/L    CARBON DIOXIDE 23 20 - 32 mmol/L    ELECTROLYTE BALANCE 16 7 - 17 mmol/L (calc)    CALCIUM 9.1 8.6 - 10.3 mg/dL    PROTEIN, TOTAL 7.6 6.1 - 8.1 g/dL    ALBUMIN 3.9 3.6 - 5.1 g/dL    BILIRUBIN, TOTAL 0.9 0.2 - 1.2 mg/dL    ALKALINE PHOSPHATASE 98 35 - 144 U/L    AST 22 10 - 35 U/L    ALT 20 9 - 46 U/L   CBC and Auto Differential    Collection Time: 01/31/25  1:02 PM   Result Value Ref Range    WHITE BLOOD CELL COUNT 10.3 3.8 - 10.8 Thousand/uL    RED BLOOD CELL COUNT 4.84 4.20 - 5.80 Million/uL    HEMOGLOBIN 14.0 13.2 - 17.1 g/dL    HEMATOCRIT 43.2 38.5 - 50.0 %    MCV 89.3 80.0 - 100.0 fL    MCH 28.9 27.0 -  33.0 pg    MCHC 32.4 32.0 - 36.0 g/dL    RDW 15.0 11.0 - 15.0 %    PLATELET COUNT 152 140 - 400 Thousand/uL    MPV 11.6 7.5 - 12.5 fL    ABSOLUTE NEUTROPHILS 7,828 (H) 1,500 - 7,800 cells/uL    ABSOLUTE LYMPHOCYTES 1,524 850 - 3,900 cells/uL    ABSOLUTE MONOCYTES 670 200 - 950 cells/uL    ABSOLUTE EOSINOPHILS 227 15 - 500 cells/uL    ABSOLUTE BASOPHILS 52 0 - 200 cells/uL    NEUTROPHILS 76 %    LYMPHOCYTES 14.8 %    MONOCYTES 6.5 %    EOSINOPHILS 2.2 %    BASOPHILS 0.5 %   Hemoglobin A1C    Collection Time: 01/31/25  1:02 PM   Result Value Ref Range    HEMOGLOBIN A1c 7.2 (H) <5.7 % of total Hgb    eAG (mg/dL) 160 mg/dL    eAG (mmol/L) 8.9 mmol/L       ECG  Encounter Date: 10/18/24   ECG 12 Lead    Narrative    Atrial fibrillation, nonspecific interventricular block, possible anterior   infarct age undetermined, heart rate 58 bpm.       Echocardiogram  No results found for this or any previous visit from the past 1095 days.      CV Studies:  EKG:  Encounter Date: 10/18/24   ECG 12 Lead    Narrative    Atrial fibrillation, nonspecific interventricular block, possible anterior   infarct age undetermined, heart rate 58 bpm.     Echocardiogram:   Echocardiogram     Narrative  [facility], [address] [place] [address]  Tel [phone] and Fax [phone]    TRANSTHORACIC ECHOCARDIOGRAM REPORT      Patient Name:     CORNELIO Orona Physician:  [id number] Matheus Hitchcock MD  Study Date:       9/13/2023          Referring           KEYSHA GUERRA  Physician:  MRN/PID:          [id number]           PCP:  Accession/Order#: [id number]          [facility] hospital [facility]  Location:           Invasive  YOB: 1950          Fellow:  Gender:           M                  Nurse:              Gisele Chacko RN  Admit Date:       9/12/2023          Sonographer:        SHLOMO Mcclain RDCS  Admission Status: Inpatient - Time   Additional Staff:  Critical  Height:            187.96 cm          CC Report to:       Jayden T7 [facility]  Weight:           178.26 kg          Study Type:         Echocardiogram  BSA:              2.90 m2  Blood Pressure: 132 /64 mmHg    Diagnosis/ICD: Z95.2-Presence of prosthetic heart valve  Indication:    s/p TAVR  Procedure/CPT: Echo Limited-99739; Color Doppler-02034; Doppler Limited-82690    Patient History:  Diabetes:          Yes  Pertinent History: HTN, A-Fib and CHF. TAMARA, morbid obesity, CKD, AS /p TAVR  (34mm Medtronic 9/12/23).    Study Detail: The following Echo studies were performed: 2D, M-Mode, Doppler and  color flow. Technically challenging study due to body habitus and  poor acoustic windows. Definity used as a contrast agent for  endocardial border definition. Total contrast used for this  procedure was 3 mL via IV push.      PHYSICIAN INTERPRETATION:  Left Ventricle: The left ventricular systolic function is normal, with an estimated ejection fraction of 60-65%. The left ventricular cavity size is normal. Left ventricular diastolic filling was indeterminate.  Left Atrium: The left atrium was not well visualized.  Right Ventricle: The right ventricle was not well visualized. Unable to determine right ventricular systolic function.  Right Atrium: The right atrium was not well visualized.  Aortic Valve: The aortic valve was not well visualized. There is transcatheter aortic valve replacement. Echo findings are consistent with normal aortic valve prosthesis function. There is trivial aortic valve regurgitation. The peak instantaneous gradient of the aortic valve is 34.6 mmHg. The mean gradient of the aortic valve is 16.1 mmHg.  Mitral Valve: The mitral valve was not well visualized. There is trace mitral valve regurgitation.  Tricuspid Valve: The tricuspid valve was not well visualized. There is trace tricuspid regurgitation.  Pulmonic Valve: The pulmonic valve is not well visualized. There is trace pulmonic valve  regurgitation.  Pericardium: There is a trivial pericardial effusion.  Aorta: The aortic root was not well visualized. The aortic root is at the upper limits of normal size.      CONCLUSIONS:  1. Poorly visualized anatomical structures due to very suboptimal image quality.  2. Left ventricular systolic function is normal with a 60-65% estimated ejection fraction.  3. There is a transcatheter aortic valve replacement.    QUANTITATIVE DATA SUMMARY:  AORTIC VALVE:  Normal Ranges:  AoV Vmax:                2.94 m/s  (<=1.7m/s)  AoV Peak P.6 mmHg (<20mmHg)  AoV Mean P.1 mmHg (1.7-11.5mmHg)  LVOT Max Herman:            0.97 m/s  (<=1.1m/s)  AoV VTI:                 50.12 cm  (18-25cm)  LVOT VTI:                18.13 cm  AoV Dimensionless Index: 0.36      [id number] Matheus Hitchcock MD  Electronically signed on 2023 at 11:29:15 AM         Final     Stress Testing IMGRESULT([id number]:1:1825): No results found for this or any previous visit from the past 1825 days.    Cardiac Catheterization:   Adult Cath     Narrative  [facility], Cath Lab, [address] [place] [address]    Cardiovascular Catheterization Report    Patient Name:     CORNELIO URBINA      Performing Physician:  [id number] Pollock MD  Study Date:       2023          Verifying Physician:   [id number] Pollock MD  MRN/PID:          [id number]           Cardiologist/Co-scrub:  Accession/Order#: [id number]          Fellow:                [id number] Jean Carlos Sanches MD  YOB: 1950          Fellow:  Gender:           M                  Referring Physician:   Artem Bauer DO  Surgeon:          Zoran Flowers MD Referring Physician:      Study: TAVR      Indications:  Severe aortic stenosis.    Transcatheter Aortic Valve Replacement (TAVR):  The right femoral artery was accessed using the transfemoral method. A 6 Fr sheath was inserted followed by the deployment of 2 Proglides. 14 Fr sentrant.  The left femoral artery was accessed percutaneously and a 6 Tajik contralateral sheath was placed. A 7 Tajik temporary pacemaker was inserted through the right femoral vein and advanced to the right ventricular apex. Adequate pacing thresholds were obtained. Evolut Fx 34 mm valve was successfully deployed under rapid ventricular pacing at 160 BPM. Transthoracic echo performed post valve deployment revealed no central aortic insufficiency and trace/trivial paravalvular aortic insufficiency. No device related events. No bleeding events occurred during the procedure. No vascular access complications were revealed. Access site was closed using 2 ProGlide devices. Hemostasis was achieved in the left femoral artery using a ProGlide device. Temporary pacing wire was removed in lab.      Complications:  No vascular access complications were revealed. No bleeding events occurred during the procedure. No device related events.    Cardiac Cath Transition of Care Summary:  Post Procedure Diagnosis: Successful TAVR with 34 mm Evolut Fx.  Blood Loss:               Estimated blood loss during the procedure was 0 mls.  Specimens Removed:        Number of specimen(s) removed: none.    ____________________________________________________________________________________  CONCLUSIONS:  1. Successful TAVR with 34 mm Evolut Fx.  2. Patient was enrolled in a research study and data was included in the TVT Registry.    ____________________________________________________________________________________  CPT Codes:  MILLY Perc,femoral-12412.62    ICD 10 Codes:  I35.0-Nonrheumatic aortic (valve) stenosis    [id number] Peter Pollock MD  Performing Physician  Electronically signed by [id number] Peter Pollock MD on 9/13/2023 at 9:38:34 AM      cc Report to: [id number] Artem Bauer DO           Final   No results found for this or any previous visit from the past 3650 days.     Cardiac Scoring: No results found for this or any previous visit from  the past 1825 days.    AAA : No results found for this or any previous visit from the past 1825 days.    OTHER: No results found for this or any previous visit from the past 1825 days.    LAST IMAGING RESULTS  Transthoracic Echo (TTE) Complete                [place] [id number]       Phone [phone] Fax [phone]    TRANSTHORACIC ECHOCARDIOGRAM REPORT    Patient Name:       CORNELIO Orona Physician:    [id number] Clinton Epperson MD  Study Date:         11/5/2024           Ordering Provider:    [id number] MANDI MAURICIO  MRN/PID:            [id number]            Fellow:  Accession#:         [id number]        Nurse:                Katt Perrin  Date of Birth/Age:  1950 / 74      Sonographer:          Estephania mcdermott RDCS  Gender Assigned at                     Additional Staff:     Donya Salmon RDCS  Birth:  Height:             185.42 cm           Admit Date:  Weight:             179.17 kg           Admission Status:     Outpatient  BSA / BMI:          2.87 m2 / 52.11     Department Location:  [facility] Echo                      kg/m2                                     Lab  Blood Pressure: 118 /78 mmHg    Study Type:    TRANSTHORACIC ECHO (TTE) COMPLETE  Diagnosis/ICD: Presence of prosthetic heart valve-Z95.2; Atherosclerotic heart                 disease of native coronary artery without angina pectoris-I25.10  Indication:    TAVR, CAD  CPT Codes:     Echo Complete w Full Doppler-50072    Patient History:  Pertinent History: AFIB, CAD, TAVR 34mm EVOLUTE FX 9/12/23.    Study Detail: The following Echo studies were performed: 2D, M-Mode, Doppler and                color flow. Technically challenging study due to poor acoustic                windows, prominent lung  artifact and body habitus. Definity used                as a contrast agent for endocardial border definition. Total                contrast used for this procedure was 3 mL via IV push.       PHYSICIAN INTERPRETATION:  Left Ventricle: The left ventricular systolic function is normal, with a visually estimated ejection fraction of 55-60%. There are no regional wall motion abnormalities. The left ventricular cavity size is normal. There is mild increased septal and mildly increased posterior left ventricular wall thickness. There is left ventricular concentric remodeling. Spectral Doppler shows a normal pattern of left ventricular diastolic filling.  Left Atrium: The left atrium is mild to moderately dilated.  Right Ventricle: The right ventricle was not well visualized. Not well visualized.  Right Atrium: The right atrium was not well visualized.  Aortic Valve: There is a prosthetic aortic valve present. The aortic valve dimensionless index is 0.55. There is no evidence of aortic valve regurgitation. The peak instantaneous gradient of the aortic valve is 17 mmHg. The mean gradient of the aortic valve is 9 mmHg. TAVR Evolute FX 34mm 9/12/23.  Mitral Valve: The mitral valve is normal in structure. There is trace mitral valve regurgitation.  Tricuspid Valve: The tricuspid valve was not well visualized. There is trace tricuspid regurgitation.  Pulmonic Valve: The pulmonic valve is structurally normal. There is no indication of pulmonic valve regurgitation.  Pericardium: No pericardial effusion noted.  Aorta: The aortic root is normal.  Systemic Veins: The inferior vena cava was not well visualized.       CONCLUSIONS:   1. The left ventricular systolic function is normal, with a visually estimated ejection fraction of 55-60%.   2. Not well visualized.   3. The left atrium is mild to moderately dilated.    QUANTITATIVE DATA SUMMARY:     2D MEASUREMENTS:           Normal Ranges:  Ao Root d:       2.20 cm    (2.0-3.7cm)  LAs:             3.30 cm   (2.7-4.0cm)  IVSd:            1.10 cm   (0.6-1.1cm)  LVPWd:           1.10 cm   (0.6-1.1cm)  LVIDd:           5.20 cm   (3.9-5.9cm)  LVIDs:           3.80 cm  LV Mass Index:   76.8 g/m2  LV % FS          26.9 %       LA VOLUME:                    Normal Ranges:  LA Vol A4C:        97.5 ml    (22+/-6mL/m2)  LA Vol A2C:        50.3 ml  LA Vol BP:         71.7 ml  LA Vol Index A4C:  33.9ml/m2  LA Vol Index A2C:  17.5 ml/m2  LA Vol Index BP:   25.0 ml/m2  LA Area A4C:       26.9 cm2  LA Area A2C:       19.8 cm2  LA Major Axis A4C: 6.3 cm  LA Major Axis A2C: 6.6 cm  LA Volume Index:   25.0 ml/m2       AORTA MEASUREMENTS:         Normal Ranges:  Ao Sinus, d:        2.20 cm (2.1-3.5cm)  Asc Ao, d:          3.30 cm (2.1-3.4cm)       LV SYSTOLIC FUNCTION BY 2D PLANIMETRY (MOD):                       Normal Ranges:  EF-A4C View:    73 % (>=55%)  EF-A2C View:    65 %  EF-Biplane:     69 %  EF-Visual:      58 %  LV EF Reported: 58 %       LV DIASTOLIC FUNCTION:           Normal Ranges:  MV Peak E:             1.29 m/s  (0.7-1.2 m/s)  MV e'                  0.085 m/s (>8.0)  MV lateral e'          0.10 m/s  MV medial e'           0.07 m/s  E/e' Ratio:            15.18     (<8.0)       MITRAL VALVE:          Normal Ranges:  MV DT:        269 msec (150-240msec)       AORTIC VALVE:                      Normal Ranges:  AoV Vmax:                2.06 m/s  (<=1.7m/s)  AoV Peak P.0 mmHg (<20mmHg)  AoV Mean P.0 mmHg  (1.7-11.5mmHg)  LVOT Max Herman:            1.28 m/s  (<=1.1m/s)  AoV VTI:                 58.00 cm  (18-25cm)  LVOT VTI:                32.00 cm  LVOT Diameter:           2.00 cm   (1.8-2.4cm)  AoV Area, VTI:           1.73 cm2  (2.5-5.5cm2)  AoV Area,Vmax:           1.95 cm2  (2.5-4.5cm2)  AoV Dimensionless Index: 0.55       TRICUSPID VALVE/RVSP:          Normal Ranges:  Peak TR Velocity:     1.53 m/s  RV Syst Pressure:     12 mmHg  (< 30mmHg)        [id number] Clinton Epperson MD  Electronically signed on 11/6/2024 at 8:00:45 AM       ** Final **      Problem List Items Addressed This Visit       (HFpEF) heart failure with preserved ejection fraction - Primary    Ascending aorta dilatation    Benign essential hypertension    CAD (coronary artery disease)    Diabetes mellitus, type 2 (Multi)    Hyperlipidemia    Nonrheumatic aortic valve stenosis    On rivaroxaban therapy    Morbid obesity (Multi)    SOBOE (shortness of breath on exertion)    Persistent atrial fibrillation with RVR (Multi)    CKD (chronic kidney disease)         Artem Bauer DO, FACC, FACOI

## 2025-04-22 DIAGNOSIS — I10 BENIGN ESSENTIAL HYPERTENSION: ICD-10-CM

## 2025-04-22 DIAGNOSIS — E11.42 TYPE 2 DIABETES MELLITUS WITH DIABETIC POLYNEUROPATHY, WITHOUT LONG-TERM CURRENT USE OF INSULIN: ICD-10-CM

## 2025-04-22 DIAGNOSIS — I48.19 PERSISTENT ATRIAL FIBRILLATION WITH RVR (MULTI): ICD-10-CM

## 2025-04-22 DIAGNOSIS — Z79.01 ON RIVAROXABAN THERAPY: ICD-10-CM

## 2025-04-22 DIAGNOSIS — M79.604 RIGHT LEG PAIN: ICD-10-CM

## 2025-04-22 DIAGNOSIS — E78.5 HYPERLIPIDEMIA, UNSPECIFIED HYPERLIPIDEMIA TYPE: ICD-10-CM

## 2025-04-22 DIAGNOSIS — I35.0 NONRHEUMATIC AORTIC VALVE STENOSIS: ICD-10-CM

## 2025-04-22 DIAGNOSIS — I50.32 CHRONIC HEART FAILURE WITH PRESERVED EJECTION FRACTION: ICD-10-CM

## 2025-04-22 DIAGNOSIS — I77.810 ASCENDING AORTA DILATATION: ICD-10-CM

## 2025-04-22 DIAGNOSIS — I25.10 CORONARY ARTERY DISEASE INVOLVING NATIVE CORONARY ARTERY OF NATIVE HEART WITHOUT ANGINA PECTORIS: ICD-10-CM

## 2025-04-22 DIAGNOSIS — E11.00 TYPE 2 DIABETES MELLITUS WITH HYPEROSMOLARITY WITHOUT COMA, WITHOUT LONG-TERM CURRENT USE OF INSULIN (MULTI): ICD-10-CM

## 2025-04-22 DIAGNOSIS — I48.19 PERSISTENT ATRIAL FIBRILLATION (MULTI): ICD-10-CM

## 2025-04-22 DIAGNOSIS — E66.01 MORBID OBESITY (MULTI): ICD-10-CM

## 2025-04-22 DIAGNOSIS — R06.02 SOBOE (SHORTNESS OF BREATH ON EXERTION): ICD-10-CM

## 2025-04-22 DIAGNOSIS — E78.00 PURE HYPERCHOLESTEROLEMIA: ICD-10-CM

## 2025-04-22 DIAGNOSIS — I25.10 CORONARY ARTERY DISEASE INVOLVING NATIVE CORONARY ARTERY OF NATIVE HEART, UNSPECIFIED WHETHER ANGINA PRESENT: ICD-10-CM

## 2025-04-29 RX ORDER — SPIRONOLACTONE 25 MG/1
25 TABLET ORAL DAILY
Qty: 90 TABLET | Refills: 0 | Status: SHIPPED | OUTPATIENT
Start: 2025-04-29

## 2025-04-29 RX ORDER — EMPAGLIFLOZIN 25 MG/1
25 TABLET, FILM COATED ORAL DAILY
Qty: 90 TABLET | Refills: 0 | Status: SHIPPED | OUTPATIENT
Start: 2025-04-29

## 2025-04-29 RX ORDER — FUROSEMIDE 40 MG/1
40 TABLET ORAL 2 TIMES DAILY
Qty: 180 TABLET | Refills: 0 | Status: SHIPPED | OUTPATIENT
Start: 2025-04-29

## 2025-04-30 ENCOUNTER — OFFICE VISIT (OUTPATIENT)
Dept: CARDIOLOGY | Facility: HOSPITAL | Age: 75
End: 2025-04-30
Payer: MEDICARE

## 2025-04-30 VITALS
HEART RATE: 51 BPM | HEIGHT: 73 IN | BODY MASS INDEX: 41.75 KG/M2 | DIASTOLIC BLOOD PRESSURE: 78 MMHG | WEIGHT: 315 LBS | SYSTOLIC BLOOD PRESSURE: 116 MMHG

## 2025-04-30 DIAGNOSIS — N18.9 CHRONIC KIDNEY DISEASE, UNSPECIFIED CKD STAGE: ICD-10-CM

## 2025-04-30 DIAGNOSIS — I35.0 NONRHEUMATIC AORTIC VALVE STENOSIS: ICD-10-CM

## 2025-04-30 DIAGNOSIS — E66.01 MORBID OBESITY (MULTI): ICD-10-CM

## 2025-04-30 DIAGNOSIS — I77.810 ASCENDING AORTA DILATATION: ICD-10-CM

## 2025-04-30 DIAGNOSIS — I10 BENIGN ESSENTIAL HYPERTENSION: ICD-10-CM

## 2025-04-30 DIAGNOSIS — I48.19 PERSISTENT ATRIAL FIBRILLATION WITH RVR (MULTI): ICD-10-CM

## 2025-04-30 DIAGNOSIS — Z95.2 S/P TAVR (TRANSCATHETER AORTIC VALVE REPLACEMENT): ICD-10-CM

## 2025-04-30 DIAGNOSIS — I50.32 CHRONIC HEART FAILURE WITH PRESERVED EJECTION FRACTION: Primary | ICD-10-CM

## 2025-04-30 DIAGNOSIS — I25.10 CORONARY ARTERY DISEASE INVOLVING NATIVE CORONARY ARTERY OF NATIVE HEART WITHOUT ANGINA PECTORIS: ICD-10-CM

## 2025-04-30 DIAGNOSIS — R06.02 SOBOE (SHORTNESS OF BREATH ON EXERTION): ICD-10-CM

## 2025-04-30 DIAGNOSIS — E78.00 PURE HYPERCHOLESTEROLEMIA: ICD-10-CM

## 2025-04-30 DIAGNOSIS — Z79.01 ON RIVAROXABAN THERAPY: ICD-10-CM

## 2025-04-30 DIAGNOSIS — I48.19 PERSISTENT ATRIAL FIBRILLATION (MULTI): ICD-10-CM

## 2025-04-30 DIAGNOSIS — E11.00 TYPE 2 DIABETES MELLITUS WITH HYPEROSMOLARITY WITHOUT COMA, WITHOUT LONG-TERM CURRENT USE OF INSULIN (MULTI): ICD-10-CM

## 2025-04-30 LAB
Q ONSET: 217 MS
QRS COUNT: 9 BEATS
QRS DURATION: 154 MS
QT INTERVAL: 482 MS
QTC CALCULATION(BAZETT): 444 MS
QTC FREDERICIA: 457 MS
R AXIS: -4 DEGREES
T AXIS: 96 DEGREES
T OFFSET: 458 MS
VENTRICULAR RATE: 51 BPM

## 2025-04-30 PROCEDURE — 93005 ELECTROCARDIOGRAM TRACING: CPT | Performed by: INTERNAL MEDICINE

## 2025-04-30 PROCEDURE — 1123F ACP DISCUSS/DSCN MKR DOCD: CPT | Performed by: INTERNAL MEDICINE

## 2025-04-30 PROCEDURE — 1159F MED LIST DOCD IN RCRD: CPT | Performed by: INTERNAL MEDICINE

## 2025-04-30 PROCEDURE — 3074F SYST BP LT 130 MM HG: CPT | Performed by: INTERNAL MEDICINE

## 2025-04-30 PROCEDURE — 3078F DIAST BP <80 MM HG: CPT | Performed by: INTERNAL MEDICINE

## 2025-04-30 PROCEDURE — 4010F ACE/ARB THERAPY RXD/TAKEN: CPT | Performed by: INTERNAL MEDICINE

## 2025-04-30 PROCEDURE — 3008F BODY MASS INDEX DOCD: CPT | Performed by: INTERNAL MEDICINE

## 2025-04-30 PROCEDURE — 99214 OFFICE O/P EST MOD 30 MIN: CPT | Performed by: INTERNAL MEDICINE

## 2025-04-30 PROCEDURE — 99214 OFFICE O/P EST MOD 30 MIN: CPT | Mod: 25 | Performed by: INTERNAL MEDICINE

## 2025-04-30 PROCEDURE — 93010 ELECTROCARDIOGRAM REPORT: CPT | Performed by: INTERNAL MEDICINE

## 2025-04-30 PROCEDURE — 1036F TOBACCO NON-USER: CPT | Performed by: INTERNAL MEDICINE

## 2025-04-30 ASSESSMENT — ENCOUNTER SYMPTOMS
LOSS OF SENSATION IN FEET: 0
OCCASIONAL FEELINGS OF UNSTEADINESS: 0
DEPRESSION: 0

## 2025-05-15 DIAGNOSIS — E11.00 TYPE 2 DIABETES MELLITUS WITH HYPEROSMOLARITY WITHOUT COMA, WITHOUT LONG-TERM CURRENT USE OF INSULIN (MULTI): ICD-10-CM

## 2025-05-15 DIAGNOSIS — Z79.01 ON RIVAROXABAN THERAPY: ICD-10-CM

## 2025-05-15 DIAGNOSIS — I48.19 PERSISTENT ATRIAL FIBRILLATION WITH RVR (MULTI): ICD-10-CM

## 2025-05-15 DIAGNOSIS — E78.00 PURE HYPERCHOLESTEROLEMIA: ICD-10-CM

## 2025-05-15 DIAGNOSIS — I10 BENIGN ESSENTIAL HYPERTENSION: ICD-10-CM

## 2025-05-15 DIAGNOSIS — I48.19 PERSISTENT ATRIAL FIBRILLATION (MULTI): ICD-10-CM

## 2025-05-15 DIAGNOSIS — I25.10 CORONARY ARTERY DISEASE INVOLVING NATIVE CORONARY ARTERY OF NATIVE HEART, UNSPECIFIED WHETHER ANGINA PRESENT: ICD-10-CM

## 2025-05-15 DIAGNOSIS — I35.0 NONRHEUMATIC AORTIC VALVE STENOSIS: ICD-10-CM

## 2025-05-15 DIAGNOSIS — I77.810 ASCENDING AORTA DILATATION: ICD-10-CM

## 2025-05-15 DIAGNOSIS — R06.02 SOBOE (SHORTNESS OF BREATH ON EXERTION): ICD-10-CM

## 2025-05-15 DIAGNOSIS — E11.42 TYPE 2 DIABETES MELLITUS WITH DIABETIC POLYNEUROPATHY, WITHOUT LONG-TERM CURRENT USE OF INSULIN: ICD-10-CM

## 2025-05-15 DIAGNOSIS — E78.5 HYPERLIPIDEMIA, UNSPECIFIED HYPERLIPIDEMIA TYPE: ICD-10-CM

## 2025-05-15 DIAGNOSIS — I50.32 CHRONIC HEART FAILURE WITH PRESERVED EJECTION FRACTION: ICD-10-CM

## 2025-05-15 DIAGNOSIS — E66.01 MORBID OBESITY (MULTI): ICD-10-CM

## 2025-05-15 DIAGNOSIS — M79.604 RIGHT LEG PAIN: ICD-10-CM

## 2025-05-15 DIAGNOSIS — I25.10 CORONARY ARTERY DISEASE INVOLVING NATIVE CORONARY ARTERY OF NATIVE HEART WITHOUT ANGINA PECTORIS: ICD-10-CM

## 2025-05-15 RX ORDER — EMPAGLIFLOZIN 25 MG/1
25 TABLET, FILM COATED ORAL DAILY
Qty: 90 TABLET | Refills: 2 | Status: SHIPPED | OUTPATIENT
Start: 2025-05-15 | End: 2026-05-15

## 2025-05-15 RX ORDER — SPIRONOLACTONE 25 MG/1
25 TABLET ORAL DAILY
Qty: 90 TABLET | Refills: 2 | Status: SHIPPED | OUTPATIENT
Start: 2025-05-15 | End: 2026-02-09

## 2025-05-15 RX ORDER — FUROSEMIDE 40 MG/1
40 TABLET ORAL 2 TIMES DAILY
Qty: 180 TABLET | Refills: 1 | Status: SHIPPED | OUTPATIENT
Start: 2025-05-15

## 2025-05-20 DIAGNOSIS — I50.32 CHRONIC HEART FAILURE WITH PRESERVED EJECTION FRACTION: ICD-10-CM

## 2025-05-29 RX ORDER — POTASSIUM CHLORIDE 20 MEQ/1
20 TABLET, EXTENDED RELEASE ORAL DAILY
Qty: 90 TABLET | Refills: 1 | Status: SHIPPED | OUTPATIENT
Start: 2025-05-29

## 2025-05-29 NOTE — TELEPHONE ENCOUNTER
----- Message from Nurse Evette ZULETA sent at 5/27/2025  3:41 PM EDT -----  Regarding: Refill  Potassium 20 to [facility] in [place].

## 2025-06-02 DIAGNOSIS — I48.19 PERSISTENT ATRIAL FIBRILLATION (MULTI): ICD-10-CM

## 2025-06-04 RX ORDER — RIVAROXABAN 20 MG/1
TABLET, FILM COATED ORAL
Qty: 90 TABLET | Refills: 1 | Status: SHIPPED | OUTPATIENT
Start: 2025-06-04

## 2025-06-12 ENCOUNTER — TELEPHONE (OUTPATIENT)
Dept: CARDIOLOGY | Facility: HOSPITAL | Age: 75
End: 2025-06-12
Payer: MEDICARE

## 2025-06-12 NOTE — TELEPHONE ENCOUNTER
Called patient pharmacy and confirmed he ha a refill there. Then called patient and let him know they would have it ready tomorrow. Patient verbalized understanding.

## 2025-06-12 NOTE — TELEPHONE ENCOUNTER
----- Message from Nurse Evette ZULETA sent at 6/12/2025 10:41 AM EDT -----  Regarding: Refill  Refill metoprolol succ 100 mg to [facility] in [place]

## 2025-06-13 DIAGNOSIS — E11.42 TYPE 2 DIABETES MELLITUS WITH DIABETIC POLYNEUROPATHY, WITHOUT LONG-TERM CURRENT USE OF INSULIN: Primary | ICD-10-CM

## 2025-06-13 NOTE — TELEPHONE ENCOUNTER
The patient called to state he is out of his     Lancets to test with    Pharmacy:  [facility] [place]       Last office visit:   2/4/2025  Next office visit:  7/31/2025

## 2025-06-16 RX ORDER — LANCETS
1 EACH MISCELLANEOUS DAILY
COMMUNITY
End: 2025-06-16 | Stop reason: SDUPTHER

## 2025-06-16 RX ORDER — LANCETS
1 EACH MISCELLANEOUS DAILY
Qty: 100 EACH | Refills: 3 | Status: SHIPPED | OUTPATIENT
Start: 2025-06-16

## 2025-07-04 DIAGNOSIS — E79.0 ASYMPTOMATIC HYPERURICEMIA: ICD-10-CM

## 2025-07-04 DIAGNOSIS — E11.42 TYPE 2 DIABETES MELLITUS WITH DIABETIC POLYNEUROPATHY, WITHOUT LONG-TERM CURRENT USE OF INSULIN: ICD-10-CM

## 2025-07-26 DIAGNOSIS — R06.02 SOBOE (SHORTNESS OF BREATH ON EXERTION): ICD-10-CM

## 2025-07-26 DIAGNOSIS — I25.10 CORONARY ARTERY DISEASE INVOLVING NATIVE CORONARY ARTERY OF NATIVE HEART WITHOUT ANGINA PECTORIS: ICD-10-CM

## 2025-07-26 DIAGNOSIS — E11.42 TYPE 2 DIABETES MELLITUS WITH DIABETIC POLYNEUROPATHY, WITHOUT LONG-TERM CURRENT USE OF INSULIN: ICD-10-CM

## 2025-07-26 DIAGNOSIS — Z79.01 ON RIVAROXABAN THERAPY: ICD-10-CM

## 2025-07-26 DIAGNOSIS — E78.5 HYPERLIPIDEMIA, UNSPECIFIED HYPERLIPIDEMIA TYPE: ICD-10-CM

## 2025-07-26 DIAGNOSIS — E78.00 PURE HYPERCHOLESTEROLEMIA: ICD-10-CM

## 2025-07-26 DIAGNOSIS — I10 BENIGN ESSENTIAL HYPERTENSION: ICD-10-CM

## 2025-07-26 DIAGNOSIS — M79.604 RIGHT LEG PAIN: ICD-10-CM

## 2025-07-26 DIAGNOSIS — I35.0 NONRHEUMATIC AORTIC VALVE STENOSIS: ICD-10-CM

## 2025-07-26 DIAGNOSIS — I50.32 CHRONIC HEART FAILURE WITH PRESERVED EJECTION FRACTION: ICD-10-CM

## 2025-07-26 DIAGNOSIS — E11.00 TYPE 2 DIABETES MELLITUS WITH HYPEROSMOLARITY WITHOUT COMA, WITHOUT LONG-TERM CURRENT USE OF INSULIN (MULTI): ICD-10-CM

## 2025-07-26 DIAGNOSIS — E66.01 MORBID OBESITY (MULTI): ICD-10-CM

## 2025-07-26 DIAGNOSIS — I48.19 PERSISTENT ATRIAL FIBRILLATION (MULTI): ICD-10-CM

## 2025-07-26 DIAGNOSIS — I48.19 PERSISTENT ATRIAL FIBRILLATION WITH RVR (MULTI): ICD-10-CM

## 2025-07-26 DIAGNOSIS — I77.810 ASCENDING AORTA DILATATION: ICD-10-CM

## 2025-07-26 DIAGNOSIS — I25.10 CORONARY ARTERY DISEASE INVOLVING NATIVE CORONARY ARTERY OF NATIVE HEART, UNSPECIFIED WHETHER ANGINA PRESENT: ICD-10-CM

## 2025-07-29 RX ORDER — LOSARTAN POTASSIUM 25 MG/1
25 TABLET ORAL DAILY
Qty: 90 TABLET | Refills: 1 | Status: SHIPPED | OUTPATIENT
Start: 2025-07-29

## 2025-07-30 LAB
ALBUMIN SERPL-MCNC: 4 G/DL (ref 3.6–5.1)
ALP SERPL-CCNC: 91 U/L (ref 35–144)
ALT SERPL-CCNC: 17 U/L (ref 9–46)
ANION GAP SERPL CALCULATED.4IONS-SCNC: 10 MMOL/L (CALC) (ref 7–17)
AST SERPL-CCNC: 16 U/L (ref 10–35)
BILIRUB SERPL-MCNC: 1 MG/DL (ref 0.2–1.2)
BUN SERPL-MCNC: 27 MG/DL (ref 7–25)
CALCIUM SERPL-MCNC: 9 MG/DL (ref 8.6–10.3)
CHLORIDE SERPL-SCNC: 98 MMOL/L (ref 98–110)
CHOLEST SERPL-MCNC: 89 MG/DL
CHOLEST/HDLC SERPL: 2.5 (CALC)
CO2 SERPL-SCNC: 29 MMOL/L (ref 20–32)
CREAT SERPL-MCNC: 1.56 MG/DL (ref 0.7–1.28)
EGFRCR SERPLBLD CKD-EPI 2021: 47 ML/MIN/1.73M2
EST. AVERAGE GLUCOSE BLD GHB EST-MCNC: 163 MG/DL
EST. AVERAGE GLUCOSE BLD GHB EST-SCNC: 9 MMOL/L
GLUCOSE SERPL-MCNC: 139 MG/DL (ref 65–99)
HBA1C MFR BLD: 7.3 %
HDLC SERPL-MCNC: 35 MG/DL
LDLC SERPL CALC-MCNC: 33 MG/DL (CALC)
NONHDLC SERPL-MCNC: 54 MG/DL (CALC)
POTASSIUM SERPL-SCNC: 4.4 MMOL/L (ref 3.5–5.3)
PROT SERPL-MCNC: 7.6 G/DL (ref 6.1–8.1)
SODIUM SERPL-SCNC: 137 MMOL/L (ref 135–146)
TRIGL SERPL-MCNC: 119 MG/DL
URATE SERPL-MCNC: 7.1 MG/DL (ref 4–8)

## 2025-07-31 ENCOUNTER — APPOINTMENT (OUTPATIENT)
Dept: PRIMARY CARE | Facility: CLINIC | Age: 75
End: 2025-07-31
Payer: MEDICARE

## 2025-07-31 VITALS
SYSTOLIC BLOOD PRESSURE: 104 MMHG | TEMPERATURE: 96.8 F | HEART RATE: 63 BPM | DIASTOLIC BLOOD PRESSURE: 50 MMHG | WEIGHT: 315 LBS | BODY MASS INDEX: 51.59 KG/M2 | OXYGEN SATURATION: 96 %

## 2025-07-31 DIAGNOSIS — E11.42 TYPE 2 DIABETES MELLITUS WITH DIABETIC POLYNEUROPATHY, WITHOUT LONG-TERM CURRENT USE OF INSULIN: Primary | ICD-10-CM

## 2025-07-31 DIAGNOSIS — E66.813 CLASS 3 SEVERE OBESITY DUE TO EXCESS CALORIES WITH SERIOUS COMORBIDITY AND BODY MASS INDEX (BMI) OF 50.0 TO 59.9 IN ADULT: ICD-10-CM

## 2025-07-31 DIAGNOSIS — G47.33 OSA ON CPAP: ICD-10-CM

## 2025-07-31 DIAGNOSIS — N13.8 BPH WITH OBSTRUCTION/LOWER URINARY TRACT SYMPTOMS: ICD-10-CM

## 2025-07-31 DIAGNOSIS — E79.0 ASYMPTOMATIC HYPERURICEMIA: ICD-10-CM

## 2025-07-31 DIAGNOSIS — I48.0 PAROXYSMAL ATRIAL FIBRILLATION (MULTI): ICD-10-CM

## 2025-07-31 DIAGNOSIS — E11.42 DIABETIC POLYNEUROPATHY ASSOCIATED WITH TYPE 2 DIABETES MELLITUS: ICD-10-CM

## 2025-07-31 DIAGNOSIS — N40.1 BPH WITH OBSTRUCTION/LOWER URINARY TRACT SYMPTOMS: ICD-10-CM

## 2025-07-31 PROCEDURE — 99214 OFFICE O/P EST MOD 30 MIN: CPT | Performed by: INTERNAL MEDICINE

## 2025-07-31 PROCEDURE — G2211 COMPLEX E/M VISIT ADD ON: HCPCS | Performed by: INTERNAL MEDICINE

## 2025-07-31 PROCEDURE — 1159F MED LIST DOCD IN RCRD: CPT | Performed by: INTERNAL MEDICINE

## 2025-07-31 PROCEDURE — 3074F SYST BP LT 130 MM HG: CPT | Performed by: INTERNAL MEDICINE

## 2025-07-31 PROCEDURE — 4010F ACE/ARB THERAPY RXD/TAKEN: CPT | Performed by: INTERNAL MEDICINE

## 2025-07-31 PROCEDURE — 3078F DIAST BP <80 MM HG: CPT | Performed by: INTERNAL MEDICINE

## 2025-07-31 RX ORDER — ALLOPURINOL 100 MG/1
100 TABLET ORAL DAILY
Qty: 90 TABLET | Refills: 1 | Status: SHIPPED | OUTPATIENT
Start: 2025-07-31 | End: 2026-01-27

## 2025-07-31 RX ORDER — GABAPENTIN 300 MG/1
300 CAPSULE ORAL NIGHTLY
Qty: 90 CAPSULE | Refills: 1 | Status: SHIPPED | OUTPATIENT
Start: 2025-07-31 | End: 2026-01-27

## 2025-07-31 RX ORDER — METFORMIN HYDROCHLORIDE 750 MG/1
750 TABLET, EXTENDED RELEASE ORAL DAILY
Qty: 90 TABLET | Refills: 1 | Status: SHIPPED | OUTPATIENT
Start: 2025-07-31 | End: 2026-01-27

## 2025-07-31 ASSESSMENT — ENCOUNTER SYMPTOMS
CONSTITUTIONAL NEGATIVE: 1
CARDIOVASCULAR NEGATIVE: 1
ALLERGIC/IMMUNOLOGIC NEGATIVE: 1
MUSCULOSKELETAL NEGATIVE: 1
RESPIRATORY NEGATIVE: 1
ENDOCRINE NEGATIVE: 1
EYES NEGATIVE: 1
GASTROINTESTINAL NEGATIVE: 1
HEMATOLOGIC/LYMPHATIC NEGATIVE: 1
PSYCHIATRIC NEGATIVE: 1
NEUROLOGICAL NEGATIVE: 1

## 2025-07-31 ASSESSMENT — PATIENT HEALTH QUESTIONNAIRE - PHQ9
2. FEELING DOWN, DEPRESSED OR HOPELESS: NOT AT ALL
SUM OF ALL RESPONSES TO PHQ9 QUESTIONS 1 AND 2: 0
1. LITTLE INTEREST OR PLEASURE IN DOING THINGS: NOT AT ALL

## 2025-07-31 NOTE — PROGRESS NOTES
Subjective   Patient ID: Fermin Coronado is a 75 y.o. male who presents for 6 month follow up .  Patient presents in follow up re:  Type 2 diabetes.  Patient has been compliant with medical therapy as prescribed and mostly compliant with diet and exercise recommendations.  HgbA1c has been maintained at goal level.  No hypoglycemia reported and no other associated complaints.          Review of Systems   Constitutional: Negative.    HENT: Negative.     Eyes: Negative.    Respiratory: Negative.     Cardiovascular: Negative.    Gastrointestinal: Negative.    Endocrine: Negative.    Genitourinary: Negative.    Musculoskeletal: Negative.    Skin: Negative.    Allergic/Immunologic: Negative.    Neurological: Negative.    Hematological: Negative.    Psychiatric/Behavioral: Negative.         Objective     Blood pressure 104/50, pulse 63, temperature 36 °C (96.8 °F), temperature source Temporal, weight (!) 177 kg (391 lb), SpO2 96%.   Physical Exam  Vitals reviewed.   Constitutional:       Appearance: Normal appearance. He is obese.   HENT:      Head: Normocephalic and atraumatic.      Right Ear: External ear normal.      Left Ear: External ear normal.      Nose: Nose normal.      Mouth/Throat:      Mouth: Mucous membranes are moist.      Pharynx: Oropharynx is clear.     Eyes:      Conjunctiva/sclera: Conjunctivae normal.      Pupils: Pupils are equal, round, and reactive to light.     Neck:      Vascular: No carotid bruit.     Cardiovascular:      Rate and Rhythm: Normal rate and regular rhythm.      Pulses: Normal pulses.      Heart sounds: Normal heart sounds. No murmur heard.  Pulmonary:      Effort: Pulmonary effort is normal.      Breath sounds: Normal breath sounds. No wheezing or rales.   Abdominal:      General: Abdomen is flat. There is no distension.      Palpations: Abdomen is soft.      Tenderness: There is no abdominal tenderness. There is no right CVA tenderness or left CVA tenderness.     Musculoskeletal:          General: Normal range of motion.      Cervical back: Normal range of motion and neck supple.     Skin:     General: Skin is warm and dry.     Neurological:      General: No focal deficit present.      Mental Status: He is alert and oriented to person, place, and time.     Psychiatric:         Mood and Affect: Mood normal.         Behavior: Behavior normal.         Assessment/Plan   Problem List Items Addressed This Visit       Asymptomatic hyperuricemia    Relevant Medications    allopurinol (Zyloprim) 100 mg tablet    Other Relevant Orders    Uric Acid    BPH with obstruction/lower urinary tract symptoms    Diabetes mellitus, type 2 (Multi) - Primary    Relevant Medications    metFORMIN XR (Glucophage-XR) 750 mg 24 hr tablet    Other Relevant Orders    Hemoglobin A1C    Comprehensive Metabolic Panel    Lipid Panel    Diabetic neuropathy (Multi)    Relevant Medications    gabapentin (Neurontin) 300 mg capsule    metFORMIN XR (Glucophage-XR) 750 mg 24 hr tablet    TAMARA on CPAP    Paroxysmal atrial fibrillation (Multi)     Other Visit Diagnoses         Class 3 severe obesity due to excess calories with serious comorbidity and body mass index (BMI) of 50.0 to 59.9 in adult                Sugars well controlled overall with A1c at goal though A1c has been rising over the past year.  Will continue present medical therapy and follow up.  Urine ACR, once again, not collected by lab as requested.  Serum creatinine noted to be up to 1.56 on recent lab.  Patient is asked to stop at lab on his way out today for urine specimen.  He is on ARB therapy for BP and renal protection.  Pt. advised on improving dietary choices and portion control as well as increasing exercise to promote weight loss.   Uric acid at goal level.  Neuropathy overall well compensated on current therapy.  Will continue present therapy and follow.  He continues follow up with Cardiology for management of cardiac issues.  Pt. continues to use BiPAP  "nightly and is tolerating well.  Advised to continue treatment and will continue to follow clinically.   He continues annual follow up with Urology as well.  He had a new c/o of hemorrhoids which he states \"won't go away\" at last OV.  He was referred to Dr. Pemberton for this and screening colonoscopy 6 months ago but has not followed through as yet.  He is encouraged to do so.      Follow up in 6 months  Lab to be drawn 1 week prior to next office visit.        "

## 2025-08-04 LAB
ALBUMIN/CREAT UR: 8 MG/G CREAT
CREAT UR-MCNC: 65 MG/DL (ref 20–320)
MICROALBUMIN UR-MCNC: 0.5 MG/DL

## 2025-08-05 ENCOUNTER — RESULTS FOLLOW-UP (OUTPATIENT)
Dept: PRIMARY CARE | Facility: CLINIC | Age: 75
End: 2025-08-05
Payer: MEDICARE

## 2025-08-08 ENCOUNTER — TELEPHONE (OUTPATIENT)
Dept: CARDIOLOGY | Facility: HOSPITAL | Age: 75
End: 2025-08-08
Payer: MEDICARE

## 2025-08-08 NOTE — TELEPHONE ENCOUNTER
Patient called office asking for a refill of his Jardiance 25 mg tablet and furosemide (Lasix) and asks they be sent to [id number] - [place], [place] - [address]  [address], [facility] [address]  Phone: [phone]  Fax: [phone]

## 2025-08-11 ENCOUNTER — APPOINTMENT (OUTPATIENT)
Dept: UROLOGY | Facility: CLINIC | Age: 75
End: 2025-08-11
Payer: MEDICARE

## 2025-08-11 DIAGNOSIS — R39.9 URINARY SYMPTOM OR SIGN: ICD-10-CM

## 2025-08-11 DIAGNOSIS — N13.8 BPH WITH OBSTRUCTION/LOWER URINARY TRACT SYMPTOMS: Primary | ICD-10-CM

## 2025-08-11 DIAGNOSIS — Z12.5 SCREENING PSA (PROSTATE SPECIFIC ANTIGEN): ICD-10-CM

## 2025-08-11 DIAGNOSIS — N40.1 BPH WITH OBSTRUCTION/LOWER URINARY TRACT SYMPTOMS: Primary | ICD-10-CM

## 2025-08-11 LAB
POC APPEARANCE, URINE: CLEAR
POC BILIRUBIN, URINE: NEGATIVE
POC BLOOD, URINE: NEGATIVE
POC COLOR, URINE: YELLOW
POC GLUCOSE, URINE: ABNORMAL MG/DL
POC KETONES, URINE: NEGATIVE MG/DL
POC LEUKOCYTES, URINE: NEGATIVE
POC NITRITE,URINE: NEGATIVE
POC PH, URINE: 5.5 PH
POC PROTEIN, URINE: NEGATIVE MG/DL
POC SPECIFIC GRAVITY, URINE: <=1.005
POC UROBILINOGEN, URINE: 0.2 EU/DL

## 2025-08-11 PROCEDURE — 4010F ACE/ARB THERAPY RXD/TAKEN: CPT | Performed by: UROLOGY

## 2025-08-11 PROCEDURE — 99213 OFFICE O/P EST LOW 20 MIN: CPT | Performed by: UROLOGY

## 2025-08-11 PROCEDURE — 81003 URINALYSIS AUTO W/O SCOPE: CPT | Performed by: UROLOGY

## 2025-08-12 ENCOUNTER — TELEPHONE (OUTPATIENT)
Dept: CARDIOLOGY | Facility: HOSPITAL | Age: 75
End: 2025-08-12
Payer: MEDICARE

## 2025-08-25 ENCOUNTER — TELEPHONE (OUTPATIENT)
Dept: PRIMARY CARE | Facility: CLINIC | Age: 75
End: 2025-08-25
Payer: MEDICARE

## 2025-08-26 DIAGNOSIS — E11.42 TYPE 2 DIABETES MELLITUS WITH DIABETIC POLYNEUROPATHY, WITHOUT LONG-TERM CURRENT USE OF INSULIN: ICD-10-CM

## 2025-08-26 RX ORDER — BLOOD-GLUCOSE METER
EACH MISCELLANEOUS
Qty: 100 EACH | Refills: 3 | Status: SHIPPED | OUTPATIENT
Start: 2025-08-26

## 2026-01-29 ENCOUNTER — APPOINTMENT (OUTPATIENT)
Dept: PRIMARY CARE | Facility: CLINIC | Age: 76
End: 2026-01-29
Payer: MEDICARE

## 2026-08-13 ENCOUNTER — APPOINTMENT (OUTPATIENT)
Dept: UROLOGY | Facility: CLINIC | Age: 76
End: 2026-08-13
Payer: MEDICARE